# Patient Record
Sex: FEMALE | Race: WHITE | NOT HISPANIC OR LATINO
[De-identification: names, ages, dates, MRNs, and addresses within clinical notes are randomized per-mention and may not be internally consistent; named-entity substitution may affect disease eponyms.]

---
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
## 2024-09-28 ENCOUNTER — APPOINTMENT (OUTPATIENT)
Dept: GENERAL RADIOLOGY | Facility: HOSPITAL | Age: 19
End: 2024-09-28
Payer: COMMERCIAL

## 2024-09-28 ENCOUNTER — HOSPITAL ENCOUNTER (EMERGENCY)
Facility: HOSPITAL | Age: 19
Discharge: HOME | End: 2024-09-28
Attending: EMERGENCY MEDICINE
Payer: COMMERCIAL

## 2024-09-28 VITALS
OXYGEN SATURATION: 100 % | HEART RATE: 86 BPM | WEIGHT: 110 LBS | TEMPERATURE: 98.5 F | BODY MASS INDEX: 15.75 KG/M2 | HEIGHT: 70 IN | SYSTOLIC BLOOD PRESSURE: 96 MMHG | DIASTOLIC BLOOD PRESSURE: 58 MMHG | RESPIRATION RATE: 16 BRPM

## 2024-09-28 DIAGNOSIS — I95.1 SYNCOPE DUE TO ORTHOSTATIC HYPOTENSION: ICD-10-CM

## 2024-09-28 DIAGNOSIS — I95.1 ORTHOSTATIC SYNCOPE: Primary | ICD-10-CM

## 2024-09-28 LAB
ALBUMIN SERPL-MCNC: 5 G/DL (ref 3.5–5.2)
ALBUMIN/GLOB SERPL: 2.5 G/DL
ALP SERPL-CCNC: 61 U/L (ref 39–117)
ALT SERPL W P-5'-P-CCNC: 13 U/L (ref 1–33)
AMPHET+METHAMPHET UR QL: NEGATIVE
AMPHETAMINES UR QL: NEGATIVE
ANION GAP SERPL CALCULATED.3IONS-SCNC: 13 MMOL/L (ref 5–15)
AST SERPL-CCNC: 19 U/L (ref 1–32)
BARBITURATES UR QL SCN: NEGATIVE
BASOPHILS # BLD AUTO: 0.06 10*3/MM3 (ref 0–0.2)
BASOPHILS NFR BLD AUTO: 1.1 % (ref 0–1.5)
BENZODIAZ UR QL SCN: NEGATIVE
BILIRUB SERPL-MCNC: 0.5 MG/DL (ref 0–1.2)
BILIRUB UR QL STRIP: NEGATIVE
BUN SERPL-MCNC: 6 MG/DL (ref 6–20)
BUN/CREAT SERPL: 7.2 (ref 7–25)
BUPRENORPHINE SERPL-MCNC: NEGATIVE NG/ML
CALCIUM SPEC-SCNC: 9.2 MG/DL (ref 8.6–10.5)
CANNABINOIDS SERPL QL: POSITIVE
CHLORIDE SERPL-SCNC: 107 MMOL/L (ref 98–107)
CLARITY UR: CLEAR
CO2 SERPL-SCNC: 24 MMOL/L (ref 22–29)
COCAINE UR QL: NEGATIVE
COLOR UR: YELLOW
CREAT SERPL-MCNC: 0.83 MG/DL (ref 0.57–1)
DEPRECATED RDW RBC AUTO: 39.6 FL (ref 37–54)
EGFRCR SERPLBLD CKD-EPI 2021: 103.6 ML/MIN/1.73
EOSINOPHIL # BLD AUTO: 0.03 10*3/MM3 (ref 0–0.4)
EOSINOPHIL NFR BLD AUTO: 0.6 % (ref 0.3–6.2)
ERYTHROCYTE [DISTWIDTH] IN BLOOD BY AUTOMATED COUNT: 11.4 % (ref 12.3–15.4)
FENTANYL UR-MCNC: NEGATIVE NG/ML
GLOBULIN UR ELPH-MCNC: 2 GM/DL
GLUCOSE BLDC GLUCOMTR-MCNC: 110 MG/DL (ref 70–130)
GLUCOSE SERPL-MCNC: 117 MG/DL (ref 65–99)
GLUCOSE UR STRIP-MCNC: NEGATIVE MG/DL
HCG INTACT+B SERPL-ACNC: <0.1 MIU/ML
HCT VFR BLD AUTO: 43.6 % (ref 34–46.6)
HGB BLD-MCNC: 14.7 G/DL (ref 12–15.9)
HGB UR QL STRIP.AUTO: NEGATIVE
HOLD SPECIMEN: NORMAL
IMM GRANULOCYTES # BLD AUTO: 0.01 10*3/MM3 (ref 0–0.05)
IMM GRANULOCYTES NFR BLD AUTO: 0.2 % (ref 0–0.5)
KETONES UR QL STRIP: NEGATIVE
LEUKOCYTE ESTERASE UR QL STRIP.AUTO: NEGATIVE
LIPASE SERPL-CCNC: 76 U/L (ref 13–60)
LYMPHOCYTES # BLD AUTO: 2.5 10*3/MM3 (ref 0.7–3.1)
LYMPHOCYTES NFR BLD AUTO: 47.3 % (ref 19.6–45.3)
MAGNESIUM SERPL-MCNC: 2.3 MG/DL (ref 1.7–2.2)
MCH RBC QN AUTO: 32 PG (ref 26.6–33)
MCHC RBC AUTO-ENTMCNC: 33.7 G/DL (ref 31.5–35.7)
MCV RBC AUTO: 95 FL (ref 79–97)
METHADONE UR QL SCN: NEGATIVE
MONOCYTES # BLD AUTO: 0.32 10*3/MM3 (ref 0.1–0.9)
MONOCYTES NFR BLD AUTO: 6.1 % (ref 5–12)
NEUTROPHILS NFR BLD AUTO: 2.36 10*3/MM3 (ref 1.7–7)
NEUTROPHILS NFR BLD AUTO: 44.7 % (ref 42.7–76)
NITRITE UR QL STRIP: NEGATIVE
NRBC BLD AUTO-RTO: 0 /100 WBC (ref 0–0.2)
NT-PROBNP SERPL-MCNC: <36 PG/ML (ref 0–450)
OPIATES UR QL: NEGATIVE
OXYCODONE UR QL SCN: NEGATIVE
PCP UR QL SCN: NEGATIVE
PH UR STRIP.AUTO: 6.5 [PH] (ref 5–8)
PLATELET # BLD AUTO: 248 10*3/MM3 (ref 140–450)
PMV BLD AUTO: 10.3 FL (ref 6–12)
POTASSIUM SERPL-SCNC: 3.7 MMOL/L (ref 3.5–5.2)
PROT SERPL-MCNC: 7 G/DL (ref 6–8.5)
PROT UR QL STRIP: NEGATIVE
RBC # BLD AUTO: 4.59 10*6/MM3 (ref 3.77–5.28)
SODIUM SERPL-SCNC: 144 MMOL/L (ref 136–145)
SP GR UR STRIP: <=1.005 (ref 1–1.03)
TRICYCLICS UR QL SCN: NEGATIVE
TROPONIN T SERPL HS-MCNC: <6 NG/L
TSH SERPL DL<=0.05 MIU/L-ACNC: 3.37 UIU/ML (ref 0.27–4.2)
UROBILINOGEN UR QL STRIP: NORMAL
WBC NRBC COR # BLD AUTO: 5.28 10*3/MM3 (ref 3.4–10.8)
WHOLE BLOOD HOLD COAG: NORMAL
WHOLE BLOOD HOLD SPECIMEN: NORMAL

## 2024-09-28 PROCEDURE — 25810000003 SODIUM CHLORIDE 0.9 % SOLUTION: Performed by: EMERGENCY MEDICINE

## 2024-09-28 PROCEDURE — 82948 REAGENT STRIP/BLOOD GLUCOSE: CPT

## 2024-09-28 PROCEDURE — 71045 X-RAY EXAM CHEST 1 VIEW: CPT

## 2024-09-28 PROCEDURE — 99284 EMERGENCY DEPT VISIT MOD MDM: CPT

## 2024-09-28 PROCEDURE — 80307 DRUG TEST PRSMV CHEM ANLYZR: CPT | Performed by: EMERGENCY MEDICINE

## 2024-09-28 PROCEDURE — 83880 ASSAY OF NATRIURETIC PEPTIDE: CPT | Performed by: EMERGENCY MEDICINE

## 2024-09-28 PROCEDURE — 93005 ELECTROCARDIOGRAM TRACING: CPT | Performed by: EMERGENCY MEDICINE

## 2024-09-28 PROCEDURE — 84702 CHORIONIC GONADOTROPIN TEST: CPT | Performed by: EMERGENCY MEDICINE

## 2024-09-28 PROCEDURE — 83690 ASSAY OF LIPASE: CPT | Performed by: EMERGENCY MEDICINE

## 2024-09-28 PROCEDURE — 83735 ASSAY OF MAGNESIUM: CPT | Performed by: EMERGENCY MEDICINE

## 2024-09-28 PROCEDURE — 84484 ASSAY OF TROPONIN QUANT: CPT | Performed by: EMERGENCY MEDICINE

## 2024-09-28 PROCEDURE — 81003 URINALYSIS AUTO W/O SCOPE: CPT | Performed by: EMERGENCY MEDICINE

## 2024-09-28 PROCEDURE — 36415 COLL VENOUS BLD VENIPUNCTURE: CPT

## 2024-09-28 PROCEDURE — 80050 GENERAL HEALTH PANEL: CPT | Performed by: EMERGENCY MEDICINE

## 2024-09-28 RX ORDER — SODIUM CHLORIDE 0.9 % (FLUSH) 0.9 %
10 SYRINGE (ML) INJECTION AS NEEDED
Status: DISCONTINUED | OUTPATIENT
Start: 2024-09-28 | End: 2024-09-28 | Stop reason: HOSPADM

## 2024-09-28 RX ADMIN — SODIUM CHLORIDE 1000 ML: 9 INJECTION, SOLUTION INTRAVENOUS at 04:27

## 2024-09-28 NOTE — ED PROVIDER NOTES
Subjective   History of Present Illness  This is a 19-year-old female presented to the emergency department with syncopal episode.  The patient has had these for the last few days.  She states that the intensified this evening.  Patient was standing up from chair when she got very lightheaded.  She proceeded to pass out.  This was witnessed by her roommate who was accompanying her.  She had another episode shortly afterwards after she stood back up again.  She apparently was seen at an outlying facility for similar symptoms recently.  Patient states that she feels very lightheaded and just generally fatigued.  She does admit to some alcohol use this evening.  She denies any headache or change in vision.  No focal weakness.  No neck pain.  No chest pain or shortness of breath.  No abdominal pain or vomiting    History provided by:  Patient   used: No        Review of Systems   Constitutional:  Negative for chills and fever.   HENT:  Negative for congestion, ear pain and sore throat.    Eyes:  Negative for visual disturbance.   Respiratory:  Negative for shortness of breath.    Cardiovascular:  Negative for chest pain.   Gastrointestinal:  Negative for abdominal pain.   Genitourinary:  Negative for difficulty urinating.   Musculoskeletal:  Negative for arthralgias.   Skin:  Negative for rash.   Neurological:  Positive for syncope. Negative for dizziness, weakness and numbness.   Psychiatric/Behavioral:  Negative for agitation.        No past medical history on file.    No Known Allergies    No past surgical history on file.    No family history on file.    Social History     Socioeconomic History    Marital status: Single           Objective   Physical Exam  Vitals and nursing note reviewed.   Constitutional:       General: She is not in acute distress.     Appearance: She is not ill-appearing or toxic-appearing.   HENT:      Mouth/Throat:      Pharynx: No posterior oropharyngeal erythema.   Eyes:       Conjunctiva/sclera: Conjunctivae normal.      Pupils: Pupils are equal, round, and reactive to light.   Cardiovascular:      Rate and Rhythm: Normal rate and regular rhythm.   Pulmonary:      Effort: Pulmonary effort is normal. No respiratory distress.   Abdominal:      General: Abdomen is flat. There is no distension.      Palpations: There is no mass.      Tenderness: There is no abdominal tenderness. There is no guarding or rebound.   Musculoskeletal:         General: No deformity. Normal range of motion.   Skin:     General: Skin is warm.      Findings: No rash.   Neurological:      General: No focal deficit present.      Mental Status: She is alert and oriented to person, place, and time.      Motor: No weakness.         ECG 12 Lead      Date/Time: 9/28/2024 5:40 AM    Performed by: Aaron Borges MD  Authorized by: Aaron Borges MD  Interpreted by ED physician  Previous ECG: no previous ECG available  Rhythm: sinus tachycardia  Rate: tachycardic  BPM: 104  QRS axis: right  Conduction: conduction normal  ST Segments: ST segments normal  Other: no other findings  Clinical impression: normal ECG  Comments: Interpretation:  EKG was directly visualized by myself, interpretations as documented in hospital course.               ED Course  ED Course as of 09/28/24 0546   Sat Sep 28, 2024   0543 Temp: 98.5 °F (36.9 °C) [JK]   0543 BP: 96/58 [JK]   0543 Temp src: Oral [JK]   0543 Heart Rate(!): 135  Interpretation:  Patient's repeat vitals, telemetry tracing, and pulse oximetry tracing were directly viewed and interpreted by myself.   O2 sat 99% on room air, interpreted as normal  Telemetry rhythm strip revealed a rate of 135 bpm, interpreted as sinus tachycardia [JK]   0543 We did perform orthostatic vitals which were significantly positive and the patient [JK]   0543 Urinalysis With Microscopic If Indicated (No Culture) - Urine, Clean Catch [JK]   0543 Urine Drug Screen - Urine, Clean Catch(!) [JK]    0543 Fentanyl, Urine - Urine, Clean Catch [JK]   0543 POC Glucose Once [JK]   0543 BNP [JK]   0543 hCG, Quantitative, Pregnancy [JK]   0543 Magnesium(!) [JK]   0543 Comprehensive Metabolic Panel(!) [JK]   0543 TSH [JK]   0543 Single High Sensitivity Troponin T  Interpretation:  Laboratory studies were reviewed and interpreted directly by myself.  CBC was unremarkable, lipase was marginal at 76, pregnancy levels negative, BNP negative, urine drug screen positive for THC, CMP unremarkable, urinalysis normal, troponin normal, TSH normal [JK]   0543 XR Chest 1 View  Interpretation:  Imaging was directly visualized by myself, per my interpretations, chest x-ray was unremarkable [JK]   0544 On reevaluation, the patient is feeling better.  Symptoms are consistent with orthostatic syncope.  I do believe she would benefit from evaluation by our syncope clinic.  Patient is agreeable to this.  I will place referral.  We had a long discussion about appropriately changing positions.  Patient was given strict return precautions.  Verbalized understanding. [JK]   0544 I had a discussion with the patient/family regarding diagnosis, diagnostic results, treatment plan, and medications. The patient/family indicated understanding of these instructions. I spent adequate time at the bedside prior to discharge necessary to discuss the aftercare instructions, giving patient education, providing explanations of the results of our evaluations/findings, and my decision making to assure that the patient/family understand the plan of care. Time was allotted to answer questions at that time and throughout the ED course. Patient is required to maintain timely follow up, as discussed. I also discussed the potential for the development of an acute emergent condition requiring further evaluation, return to the ER, admission, or even surgical intervention.  I encouraged the patient to return to the emergency department immediately for any concerns,  worsening symptoms, new complaints, or if symptoms persist and they are unable to seek follow-up in a timely fashion. The patient/family expressed understanding and agreement with this plan    Shared decision making:   After full review of the patient's clinical presentation, review of any work-up including but not limited to laboratory studies and radiology obtained, I had a discussion with the patient.  Treatment options were discussed as well as the risks, benefits and consequences.  I discussed all findings with the patient and family members if available.  During the discussion, treatment goals were understood by all as well as any misconceptions which were addressed with the patient.  Ample time was given for any questions they may have had.  They are in agreement with the treatment plan as well as final disposition. [JK]      ED Course User Index  [JK] Aaron Borges MD                                             Medical Decision Making  This is a 19-year-old female presented to the emergency department with a syncopal episode.  The patient is endorsing some postural syncope.  I am concerned for possible orthostatic hypotension.  Patient's neurologic exam is normal at this time.  Overall, the patient is nontoxic.  Afebrile.  IV access was established and the patient.  Placed on continuous telemetry monitoring.  Given the patient's presentation, differential is broad and will require further evaluation.  Workup initiated.      Differential diagnosis: Syncope, near syncope, orthostasis, vasovagal episode, POTS, dysrhythmia, acute kidney injury, electrolyte abnormality, anemia      Amount and/or Complexity of Data Reviewed  External Data Reviewed: labs, radiology, ECG and notes.     Details: External laboratories, imaging as well as notes were reviewed personally by myself.  All relevant studies were used to guide decision making.     Date of previous record: 9/26/2024    Source of note:  emergency  department    Summary: Patient was seen evaluate for similar symptoms.  I did review basic laboratory studies on file as well as previous chest x-ray and EKG.  Records reviewed    Labs: ordered. Decision-making details documented in ED Course.  Radiology: ordered and independent interpretation performed. Decision-making details documented in ED Course.  ECG/medicine tests: ordered and independent interpretation performed. Decision-making details documented in ED Course.    Risk  Prescription drug management.        Final diagnoses:   Orthostatic syncope   Syncope due to orthostatic hypotension       ED Disposition  ED Disposition       ED Disposition   Discharge    Condition   Stable    Comment   --               [facility] CARDIOLOGY  [address]  [facility] [id number] [phone]  Call in 1 day           Medication List      No changes were made to your prescriptions during this visit.            Aaron Borges MD  09/28/24 0546

## 2024-09-28 NOTE — Clinical Note
[facility] EMERGENCY DEPARTMENT  [address]  [facility] [address]  Phone: [phone]    Celeste Johnson was seen and treated in our emergency department on 9/28/2024.  She may return to work on 10/01/2024.         Thank you for choosing [facility].    Aaron Borges MD

## 2024-09-29 LAB
QT INTERVAL: 350 MS
QTC INTERVAL: 460 MS

## 2024-09-30 ENCOUNTER — TELEPHONE (OUTPATIENT)
Dept: CARDIOLOGY | Facility: HOSPITAL | Age: 19
End: 2024-09-30
Payer: COMMERCIAL

## 2024-09-30 NOTE — TELEPHONE ENCOUNTER
Caller: PetraCeleste Rose     Relationship: [unfilled]     Best call back number: [phone]    What is your medical concern? PATIENT IS A NEW PT WITH HER FIRST APPOINTMENT ON 10.03.24. PATIENT IS HAVING EPISODES WHERE SHE IS PASSING OUT, AFTERWARDS SHE FEELS DIZZY AND NAUSEOUS. THIS JUST STARTED HAPPENING ABOUT A WEEK THOUGH. IT IS INCREASING IN FREQUENCY. SHE IS SCHEDULED TO FLY OUT OF STATE ON 10.03.24. SHE IS VERY WORRIED ABOUT THIS AND WOULD LIKE TO SPEAK TO SOMEONE TO SEE IF ITS SAFE FOR HER TO FLY. PLEASE REACH OUT TO ADVISE PT.

## 2024-10-01 ENCOUNTER — OFFICE VISIT (OUTPATIENT)
Dept: CARDIOLOGY | Facility: HOSPITAL | Age: 19
End: 2024-10-01
Payer: COMMERCIAL

## 2024-10-01 ENCOUNTER — HOSPITAL ENCOUNTER (OUTPATIENT)
Dept: CARDIOLOGY | Facility: HOSPITAL | Age: 19
Discharge: HOME | End: 2024-10-01
Payer: COMMERCIAL

## 2024-10-01 VITALS
HEART RATE: 86 BPM | RESPIRATION RATE: 18 BRPM | WEIGHT: 117.6 LBS | TEMPERATURE: 97.5 F | OXYGEN SATURATION: 99 % | DIASTOLIC BLOOD PRESSURE: 62 MMHG | BODY MASS INDEX: 16.83 KG/M2 | HEIGHT: 70 IN | SYSTOLIC BLOOD PRESSURE: 101 MMHG

## 2024-10-01 DIAGNOSIS — R55 SYNCOPE AND COLLAPSE: Primary | ICD-10-CM

## 2024-10-01 DIAGNOSIS — F41.9 ANXIETY: ICD-10-CM

## 2024-10-01 DIAGNOSIS — R55 SYNCOPE AND COLLAPSE: ICD-10-CM

## 2024-10-01 PROCEDURE — 99204 OFFICE O/P NEW MOD 45 MIN: CPT | Performed by: NURSE PRACTITIONER

## 2024-10-01 NOTE — PROGRESS NOTES
[facility] Heart Monitor Documentation    Celeste Johnson  2005  [id number]  10/01/24      [] ZIO XT Patch  Model [id number] Prescribed for  Days    Serial Number: (N + 9 Digits) N   Apply-By Date on Box:   USPS Tracking Number:   USPS Tracking        [x] [employer]ian MINI PLUS Mobile Cardiac Telemetry  Model BGMINIPLUS Prescribed for 30 Days    Serial Number: (BGM + 7 Digits) [id number]  Shipped-By Date on Box: 09/24/2024  UPS Tracking Number: [id number]  UPS Tracking      [] [employer] MINI Holter Monitor  Model BGMINIEL Prescribed for  Days    Serial Number: (7 Digits)   Shipped-By Date on Box:   UPS Tracking Number: [id number]  UPS Tracking        This monitor was applied to the patient's chest and checked for proper functioning.  Ms. Celeste Johnson was instructed in the proper use of this monitor.  She was given the opportunity to ask questions and left the office with the device 's instruction manual.    Amarilis Brandt CMA, 09:54 EDT, 10/01/24                  [facility] 8[facility]

## 2024-10-01 NOTE — PATIENT INSTRUCTIONS
- Heart monitor for 1 month     - Office will schedule testing  - Office will call or send message with testing results    - Neurology department will call for appointment    - Contact [facility] PCP for evaluation

## 2024-10-01 NOTE — PROGRESS NOTES
"Chief Complaint  Loss of Consciousness    Subjective      History of Present Illness {  Problem List  Visit  Diagnosis   Encounters  Notes  Medications  Labs  Result Review Imaging  Media :23}     Celeste Johnson, 19 y.o. female with anxiety- history of panic attacks, syncope presents to [facility] clinic for Loss of Consciousness.    Patient presents today after recent emergency department evaluations for suspected vasovagal/orthostatic syncope.  Recent episode that prompted Psychiatric ED evaluation occurred while standing up from chair and she got lightheaded with syncope. ED workup with normal TSH, negative troponin.  ECG revealed sinus tachycardia.    Presents today reporting recurrent episode of syncope since ED evaluation. One episode while standing from sitting position at library and then walking to her car. No tachypalpitation or cardiac symptoms around the time of these events, no exertional syncope. Reports nausea after syncopal episodes. Reports prior syncopal episode that required ED evaluation she was drinking alcohol at the time. History of orthostatic symptoms. Reports 60-90oz of hydration per day, 2 meals per day currently. Minimal caffeine. No family history of arrhythmia.        Objective     Vital Signs:   Vitals:    10/01/24 0844 10/01/24 0845   BP: 116/71 101/62   BP Location: Left arm Left arm   Patient Position: Standing Sitting   Cuff Size: Adult Adult   Pulse: 111 86   Resp:  18   Temp: 97.5 °F (36.4 °C) 97.5 °F (36.4 °C)   TempSrc: Temporal Temporal   SpO2: 97% 99%   Weight:  53.3 kg (117 lb 9.6 oz)   Height:  177.8 cm (70\")     Body mass index is 16.87 kg/m².  Physical Exam  Vitals and nursing note reviewed.   Constitutional:       Appearance: Normal appearance.   HENT:      Head: Normocephalic.   Eyes:      Extraocular Movements: Extraocular movements intact.   Neck:      Vascular: No carotid bruit.   Cardiovascular:      Rate and Rhythm: Normal rate " and regular rhythm.      Pulses: Normal pulses.      Heart sounds: Normal heart sounds, S1 normal and S2 normal. No murmur heard.  Pulmonary:      Effort: Pulmonary effort is normal. No respiratory distress.      Breath sounds: Normal breath sounds.   Musculoskeletal:      Cervical back: Neck supple.      Right lower leg: No edema.      Left lower leg: No edema.   Skin:     General: Skin is warm and dry.   Neurological:      General: No focal deficit present.      Mental Status: She is alert.   Psychiatric:         Mood and Affect: Mood normal.         Behavior: Behavior normal.         Thought Content: Thought content normal.        Data Reviewed:{ Labs  Result Review  Imaging  Med Tab  Media :23}     TSH (09/28/2024 03:57)  BNP (09/28/2024 03:57)  Single High Sensitivity Troponin T (09/28/2024 03:57)  Magnesium (09/28/2024 03:57)  Comprehensive Metabolic Panel (09/28/2024 03:57)  CBC & Differential (09/28/2024 03:57)  hCG, Quantitative, Pregnancy (09/28/2024 03:57)  ECG 12 Lead ED Triage Standing Order; Syncope (09/28/2024 04:08)       Assessment & Plan   Assessment and Plan {CC Problem List  Visit Diagnosis  ROS  Review (Popup)  Health Maintenance  Quality  BestPractice  Medications  SmartSets  SnapShot Encounters  Media :23}       1. Syncope and collapse  -Recent recurrent syncope prompted ED evaluations.   -Suspected vasovagal episodes given symptom description  -ED workup with normal TSH, negative troponin.  ECG revealed sinus tachycardia.  -Given new onset of symptom description will complete 30-day MCOT for arrhythmia surveillance, TTE for structural/functional evaluation, referral to [facility] dysautonomia clinic given recurrent and limiting symptoms  - Adult Transthoracic Echo Complete W/ Cont if Necessary Per Protocol; Future  - Mobile Cardiac Outpatient Telemetry; Future  - Ambulatory Referral to Neurology; [facility] - dysautonomia clinic  -Extensive discussion on vasovagal  triggers, increasing hydration/sodium intake, compression garment/sock use    2. Anxiety  -Notes a history of anxiety with panic attacks  -Discussed anxiety could be contributing to vagal nerve stimulation and would advise establishing with PCP for further evaluation      Follow Up {Instructions Charge Capture  Follow-up Communications :23}     Return in about 6 weeks (around 11/12/2024) for Office follow-up, Monitor results, Results follow-up, Recheck, BP check.      Patient was given instructions and counseling regarding her condition or for health maintenance advice. Please see specific information pulled into the AVS if appropriate. Patient was instructed to call the [facility] with any questions, concerns, or worsening symptoms.    Dictated Utilizing Dragon Dictation   Please note that portions of this note were completed with a voice recognition program. Part of this note may be an electronic transcription/translation of spoken language to printed text using the Dragon Dictation System.

## 2024-10-07 ENCOUNTER — HOSPITAL ENCOUNTER (OUTPATIENT)
Facility: HOSPITAL | Age: 19
Discharge: HOME | End: 2024-10-07
Admitting: NURSE PRACTITIONER
Payer: COMMERCIAL

## 2024-10-07 VITALS
WEIGHT: 117.5 LBS | DIASTOLIC BLOOD PRESSURE: 70 MMHG | SYSTOLIC BLOOD PRESSURE: 120 MMHG | BODY MASS INDEX: 16.82 KG/M2 | HEIGHT: 70 IN

## 2024-10-07 DIAGNOSIS — R55 SYNCOPE AND COLLAPSE: ICD-10-CM

## 2024-10-07 LAB
ASCENDING AORTA: 2.7 CM
BH CV ECHO MEAS - AO MAX PG: 5 MMHG
BH CV ECHO MEAS - AO MEAN PG: 3 MMHG
BH CV ECHO MEAS - AO ROOT DIAM: 2.9 CM
BH CV ECHO MEAS - AO V2 MAX: 112 CM/SEC
BH CV ECHO MEAS - AO V2 VTI: 23.6 CM
BH CV ECHO MEAS - AVA(I,D): 2.6 CM2
BH CV ECHO MEAS - EDV(CUBED): 79.5 ML
BH CV ECHO MEAS - EDV(MOD-SP2): 80.9 ML
BH CV ECHO MEAS - EDV(MOD-SP4): 84.4 ML
BH CV ECHO MEAS - EF(MOD-BP): 60.6 %
BH CV ECHO MEAS - EF(MOD-SP2): 62.3 %
BH CV ECHO MEAS - EF(MOD-SP4): 59.1 %
BH CV ECHO MEAS - ESV(CUBED): 24.4 ML
BH CV ECHO MEAS - ESV(MOD-SP2): 30.5 ML
BH CV ECHO MEAS - ESV(MOD-SP4): 34.5 ML
BH CV ECHO MEAS - FS: 32.6 %
BH CV ECHO MEAS - IVS/LVPW: 1 CM
BH CV ECHO MEAS - IVSD: 0.6 CM
BH CV ECHO MEAS - LA DIMENSION: 2.4 CM
BH CV ECHO MEAS - LAT PEAK E' VEL: 17.3 CM/SEC
BH CV ECHO MEAS - LV DIASTOLIC VOL/BSA (35-75): 50.8 CM2
BH CV ECHO MEAS - LV MASS(C)D: 72.9 GRAMS
BH CV ECHO MEAS - LV MAX PG: 3.6 MMHG
BH CV ECHO MEAS - LV MEAN PG: 2 MMHG
BH CV ECHO MEAS - LV SYSTOLIC VOL/BSA (12-30): 20.8 CM2
BH CV ECHO MEAS - LV V1 MAX: 94.9 CM/SEC
BH CV ECHO MEAS - LV V1 VTI: 19.5 CM
BH CV ECHO MEAS - LVIDD: 4.3 CM
BH CV ECHO MEAS - LVIDS: 2.9 CM
BH CV ECHO MEAS - LVOT AREA: 3.1 CM2
BH CV ECHO MEAS - LVOT DIAM: 2 CM
BH CV ECHO MEAS - LVPWD: 0.6 CM
BH CV ECHO MEAS - MED PEAK E' VEL: 11.6 CM/SEC
BH CV ECHO MEAS - MV A MAX VEL: 58.7 CM/SEC
BH CV ECHO MEAS - MV DEC SLOPE: 284 CM/SEC2
BH CV ECHO MEAS - MV DEC TIME: 0.21 SEC
BH CV ECHO MEAS - MV E MAX VEL: 79.1 CM/SEC
BH CV ECHO MEAS - MV E/A: 1.35
BH CV ECHO MEAS - MV MAX PG: 3.9 MMHG
BH CV ECHO MEAS - MV MEAN PG: 2 MMHG
BH CV ECHO MEAS - MV P1/2T: 102.6 MSEC
BH CV ECHO MEAS - MV V2 VTI: 27.3 CM
BH CV ECHO MEAS - MVA(P1/2T): 2.14 CM2
BH CV ECHO MEAS - MVA(VTI): 2.24 CM2
BH CV ECHO MEAS - PA ACC TIME: 0.17 SEC
BH CV ECHO MEAS - SV(LVOT): 61.3 ML
BH CV ECHO MEAS - SV(MOD-SP2): 50.4 ML
BH CV ECHO MEAS - SV(MOD-SP4): 49.9 ML
BH CV ECHO MEAS - SVI(LVOT): 36.9 ML/M2
BH CV ECHO MEAS - SVI(MOD-SP2): 30.3 ML/M2
BH CV ECHO MEAS - SVI(MOD-SP4): 30 ML/M2
BH CV ECHO MEAS - TAPSE (>1.6): 2.48 CM
BH CV ECHO MEAS - TR MAX PG: 10.6 MMHG
BH CV ECHO MEAS - TR MAX VEL: 162.5 CM/SEC
BH CV ECHO MEASUREMENTS AVERAGE E/E' RATIO: 5.47
BH CV XLRA - RV BASE: 3 CM
BH CV XLRA - RV LENGTH: 6.2 CM
BH CV XLRA - RV MID: 2.3 CM
BH CV XLRA - TDI S': 13.1 CM/SEC
LEFT ATRIUM VOLUME INDEX: 13.7 ML/M2

## 2024-10-07 PROCEDURE — 93306 TTE W/DOPPLER COMPLETE: CPT

## 2024-10-07 PROCEDURE — 93306 TTE W/DOPPLER COMPLETE: CPT | Performed by: INTERNAL MEDICINE

## 2024-10-07 NOTE — PROGRESS NOTES
Your echocardiogram is within normal limits.   Your heart squeezes normally.  There are no significant valvular abnormalities noted. Please keep your follow up with Cody RAMOS.

## 2024-10-28 ENCOUNTER — DOCUMENTATION (OUTPATIENT)
Dept: CARDIOLOGY | Facility: HOSPITAL | Age: 19
End: 2024-10-28
Payer: COMMERCIAL

## 2024-10-28 NOTE — PROGRESS NOTES
Event monitor report from 10/25/24, patient triggered symptom that she passed out at 1:11 AM. Report has been printed for your review.

## 2024-11-12 ENCOUNTER — OFFICE VISIT (OUTPATIENT)
Dept: CARDIOLOGY | Facility: HOSPITAL | Age: 19
End: 2024-11-12
Payer: COMMERCIAL

## 2024-11-12 VITALS
BODY MASS INDEX: 17.15 KG/M2 | DIASTOLIC BLOOD PRESSURE: 58 MMHG | SYSTOLIC BLOOD PRESSURE: 121 MMHG | TEMPERATURE: 97 F | WEIGHT: 119.8 LBS | RESPIRATION RATE: 18 BRPM | OXYGEN SATURATION: 96 % | HEIGHT: 70 IN | HEART RATE: 86 BPM

## 2024-11-12 DIAGNOSIS — F41.9 ANXIETY: Primary | ICD-10-CM

## 2024-11-12 PROCEDURE — 99214 OFFICE O/P EST MOD 30 MIN: CPT | Performed by: NURSE PRACTITIONER

## 2024-11-12 NOTE — PROGRESS NOTES
Chief Complaint  Loss of Consciousness    Subjective      History of Present Illness {  Problem List  Visit  Diagnosis   Encounters  Notes  Medications  Labs  Result Review Imaging  Media :23}     Celeste Allen-Milton, 19 y.o. female with anxiety- history of panic attacks, syncope presents to [facility] for Loss of Consciousness.    Since previous evaluation patient completed echocardiogram that revealed normal LV systolic function, normal LV/RV cavity size and wall thickness, cardiac valves structurally normal with no significant stenosis/regurgitation, normal pericardium. Cardiac monitor completed with diagnostic data approximately 18 days, 18 hours.  No significant arrhythmia on cardiac monitor.  Low burden PAC/PVC less than 1%.  Heart rate minimum 47, average 85, maximum 179. Triggered events, which were associated with the symptom of passing out/lightheadedness, were associated with sinus tachycardia.    Presents today reporting intermittent syncope recurrence, but less frequent. A few episodes while wearing cardiac monitor, 4-5 events while wearing cardiac monitor; no arrhythmia during events on [facility]. Continued intermittent syncope 1x per week, does note awareness of prodromal response. Does report she was likely pregnant around the time of previous evaluation with possibly miscarriage, follow-up planned with OBGYN. Working on increasing hydration and maintaining adequate nutrition.       Previous evaluation:  Patient presents today after recent emergency department evaluations for suspected vasovagal/orthostatic syncope.  Recent episode that prompted [facility] ED evaluation occurred while standing up from chair and she got lightheaded with syncope. ED workup with normal TSH, negative troponin.  ECG revealed sinus tachycardia.    Presents today reporting recurrent episode of syncope since ED evaluation. One episode while standing from sitting position at library and  "then walking to her car. No tachypalpitation or cardiac symptoms around the time of these events, no exertional syncope. Reports nausea after syncopal episodes. Reports prior syncopal episode that required ED evaluation she was drinking alcohol at the time. History of orthostatic symptoms. Reports 60-90oz of hydration per day, 2 meals per day currently. Minimal caffeine. No family history of arrhythmia.        Objective     Vital Signs:   Vitals:    11/12/24 1109   BP: 121/58   BP Location: Left arm   Patient Position: Sitting   Cuff Size: Adult   Pulse: 86   Resp: 18   Temp: 97 °F (36.1 °C)   TempSrc: Temporal   SpO2: 96%   Weight: 54.3 kg (119 lb 12.8 oz)   Height: 177.8 cm (70\")     Body mass index is 17.19 kg/m².  Physical Exam  Vitals and nursing note reviewed.   Constitutional:       Appearance: Normal appearance.   HENT:      Head: Normocephalic.   Eyes:      Extraocular Movements: Extraocular movements intact.   Neck:      Vascular: No carotid bruit.   Cardiovascular:      Rate and Rhythm: Normal rate and regular rhythm.      Pulses: Normal pulses.      Heart sounds: Normal heart sounds, S1 normal and S2 normal. No murmur heard.  Pulmonary:      Effort: Pulmonary effort is normal. No respiratory distress.      Breath sounds: Normal breath sounds.   Musculoskeletal:      Cervical back: Neck supple.      Right lower leg: No edema.      Left lower leg: No edema.   Skin:     General: Skin is warm and dry.   Neurological:      General: No focal deficit present.      Mental Status: She is alert.   Psychiatric:         Mood and Affect: Mood normal.         Behavior: Behavior normal.         Thought Content: Thought content normal.        Data Reviewed:{ Labs  Result Review  Imaging  Med Tab  Media :23}     Cardiac Event Monitor (NITHIN) or Mobile Cardiac Outpatient Telemetry (MCT) (10/01/2024 14:36)  Adult Transthoracic Echo Complete W/ Cont if Necessary Per Protocol (10/07/2024 14:43)    TSH (09/28/2024 " 03:57)  BNP (09/28/2024 03:57)  Single High Sensitivity Troponin T (09/28/2024 03:57)  Magnesium (09/28/2024 03:57)  Comprehensive Metabolic Panel (09/28/2024 03:57)  CBC & Differential (09/28/2024 03:57)  hCG, Quantitative, Pregnancy (09/28/2024 03:57)  ECG 12 Lead ED Triage Standing Order; Syncope (09/28/2024 04:08)       Assessment & Plan   Assessment and Plan {CC Problem List  Visit Diagnosis  ROS  Review (Popup)  [facility]  Quality  BestPractice  Medications  SmartSets  SnapShot Encounters  Media :23}       1. Syncope and collapse  -Recent recurrent syncope prompted ED evaluations.   -Suspected vasovagal, likely non-cardiac given recent cardiac testing and no arrhythmia during events. Possibly related to increased stress with potential miscarriage.  -ED workup with normal TSH, negative troponin.  ECG revealed sinus tachycardia.  -Cardiac monitor completed with diagnostic data approximately 18 days, 18 hours.  No significant arrhythmia on cardiac monitor.  Low burden PAC/PVC less than 1%.  Heart rate minimum 47, average 85, maximum 179. Triggered events, which were associated with the symptom of passing out/lightheadedness, were associated with sinus tachycardia.  -TTE 10/7/24 normal LV systolic function, normal LV/RV cavity size and wall thickness, cardiac valves structurally normal with no significant stenosis/regurgitation, normal pericardium.  -Referral to [facility]onomia clinic pending, ordered during initial evaluation. Continue with scheduled appointment.  -Discussion on vasovagal triggers, increasing hydration/sodium intake, compression garment/sock use.  -Discussed follow-up recommendations on scheduled [facility] neurology/dysautonomia, and PCP; option of cardiology referral. She would prefer to continue efforts on stress reduction, avoiding vagal nerve triggering factors and follow-up with PCP/neurology as scheduled.  Discussed contacting heart valve center for worsened symptoms or if  further cardiac needs arise in future.    2. Anxiety  -Notes a history of anxiety with panic attacks  -Discussed anxiety could be contributing to vagal nerve stimulation and completed establishing care with PCP for further evaluation, behavioral health appointment at  pending.  Requesting referral to [facility] behavioral health medicine.  -Referral to [facility] behavioral health medicine placed      Follow Up {Instructions Charge Capture  Follow-up Communications :23}     Return if symptoms worsen or fail to improve.    Time Spent: I spent 34 minutes caring for Celeste Johnson on this date of service. This time includes time spent by me in the following activities: preparing for the visit, reviewing tests, obtaining and/or reviewing a separately obtained history, performing a medically appropriate examination and/or evaluation, counseling and educating the patient/family/caregiver, documenting information in the medical record and independently interpreting results and communicating that information with the patient/family/caregiver. All time noted occurred on the date of service.      Patient was given instructions and counseling regarding her condition or for health maintenance advice. Please see specific information pulled into the AVS if appropriate. Patient was instructed to call the [facility] with any questions, concerns, or worsening symptoms.    Dictated Utilizing Dragon Dictation   Please note that portions of this note were completed with a voice recognition program. Part of this note may be an electronic transcription/translation of spoken language to printed text using the Dragon Dictation System.

## 2024-12-18 ENCOUNTER — OFFICE VISIT (OUTPATIENT)
Age: 19
End: 2024-12-18
Payer: COMMERCIAL

## 2024-12-18 VITALS
BODY MASS INDEX: 17.31 KG/M2 | DIASTOLIC BLOOD PRESSURE: 72 MMHG | HEIGHT: 70 IN | SYSTOLIC BLOOD PRESSURE: 118 MMHG | HEART RATE: 92 BPM | WEIGHT: 120.9 LBS

## 2024-12-18 DIAGNOSIS — F41.0 GENERALIZED ANXIETY DISORDER WITH PANIC ATTACKS: Primary | ICD-10-CM

## 2024-12-18 DIAGNOSIS — F41.1 GENERALIZED ANXIETY DISORDER WITH PANIC ATTACKS: Primary | ICD-10-CM

## 2024-12-18 DIAGNOSIS — F33.1 MODERATE EPISODE OF RECURRENT MAJOR DEPRESSIVE DISORDER: ICD-10-CM

## 2024-12-18 DIAGNOSIS — Z51.81 ENCOUNTER FOR THERAPEUTIC DRUG MONITORING: ICD-10-CM

## 2024-12-18 RX ORDER — ONDANSETRON 4 MG/1
4 TABLET, FILM COATED ORAL DAILY
Qty: 30 TABLET | Refills: 0 | Status: SHIPPED | OUTPATIENT
Start: 2024-12-18

## 2024-12-18 RX ORDER — PROPRANOLOL HYDROCHLORIDE 10 MG/1
10-20 TABLET ORAL DAILY PRN
Qty: 120 TABLET | Refills: 0 | Status: SHIPPED | OUTPATIENT
Start: 2024-12-18

## 2024-12-18 RX ORDER — ESCITALOPRAM OXALATE 10 MG/1
TABLET ORAL
Qty: 57 TABLET | Refills: 0 | Status: SHIPPED | OUTPATIENT
Start: 2024-12-18 | End: 2025-02-16

## 2024-12-18 NOTE — PROGRESS NOTES
New Patient Office Visit      Date: 2024  Patient Name: Celeste Johnson  : 2005   MRN: [id number]   Care Team: Patient Care Team:  Gisela Elizabeth MD as PCP - General (Internal Medicine)  Yohana Mcdonald APRN  (Psychiatry)    Referring Provider: Gisela Elizabeth MD    Chief Complaint:      ICD-10-CM ICD-9-CM   1. Generalized anxiety disorder with panic attacks  F41.1 300.02    F41.0 300.01   2. Moderate episode of recurrent major depressive disorder  F33.1 296.32   3. Encounter for therapeutic drug monitoring  Z51.81 V58.83        Celeste Johnson is a 19 y.o. female who is here today for anxiety and panic. This is their her encounter with this provider.  History of Present Illness   She is here to discuss medication management of her anxiety and depression. She was initially diagnosed with anxiety during her senior year of high school, experiencing multiple daily panic attacks. She was prescribed BuSpar 15 mg daily, which she discontinued 2 to 3 months into her freshman year of college due to perceived ineffectiveness and difficulty remembering to take it. Her anxiety improved during her freshman and sophomore years but has since worsened over the past year.   She has previously engaged in therapy but found it unhelpful.    She experienced severe fainting spells around the end of 2024 and beginning of 10/2024, necessitating 2 emergency room visits. Initially suspected to be POTS, it was later diagnosed as vasovagal syncope. Her primary care physician and cardiologist suggested that her anxiety may be contributing to her symptoms and suggested she see behavioral health.. She reports that her heart rate increases prior to fainting and decreases during episodes and she has been advised to increase her salt intake.       ANXIETY  She reports increased stress, difficulty initiating daily activities, and decreased appetite, which she attributes to her anxiety and depression. She  "experiences intrusive thoughts about unlikely scenarios and recalls a traumatic event at the end of her senior year involving a suicide accusation.  Patient reports lots of anxiety related to school and wondering if she is going to pass out while walking to class. She worries about what people think about her, how her future will play out,  academics. She rates her daily anxiety is 9/10 on 0-10 scale with 10 being the worst.   She has experienced panic attacks, with the most recent episode occurring yesterday morning due to an exam. She reports some social anxiety but does not find it debilitating. She denies OCD symptoms    MOOD  She also reports mood disturbances, triggered by  a recent pregnancy scare in Nov and conflicts with her roommate  (she works with)  started  rumors and drama ensued.  This has left her not wanting to leave her room. She notices her mood \"dips\" in smith months, but more so this year.  She rates her depression as 5 or 6 on good days and 7 or 8 on bad days. Her PHQ score is 7 today which appears low compared to verbal report.  Patient denies ever experiencing any cluster of symptoms that might indicate fred or hypomania such as decrease need for sleep, rapid speech pattern, risky behavior, increase in energy, goal directed activity or grandiosity. She reports no suicidal ideation. She reports no history of suicide attempts, self-harm, or psychiatric hospitalizations. room mate  .         SLEEP/APPETITE   She reports fluctuating energy levels, with periods of high energy and low sleep requirements followed by periods of excessive sleep.No other sx of bipolarity noted.  She reports recent difficulty falling asleep and staying asleep, describing herself as a night owl. She reports no nightmares.  Appetite is down due to anxiety.  She has always been more of a snacker than eating a full meal. She gets pretty full fast. She has not taken Zofran.Admits physicians in the past have told her she " needs to gain weight.  Patient denies any past or present eating disorders. Reports she sometimes vomits due to anxiety.      SOCIAL HISTORY  Patient was Born and raised in [place] by her parents and came to [place] to attend . Raised by parents and has a younger brother. She describes her childhood as very good and healthy. 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  She lives off campus with 3 roommates     FAMILY HISTORY  Her brother has ADHD. Her mother has anxiety but no formal diagnosis. Her paternal grandfather has had 2 or 3 heart attacks, and her maternal grandfather has heart disease. Her father has high blood pressure. Her grandfather and first cousin have epilepsy. She is unsure about a family history of thyroid disease.    LEGAL/SUBSTANCE USE  She occasionally uses marijuana (1x week)  for anxiety relief and consumes alcohol socially, approximately 3 drinks per week. She reports no legal history, seizures, or head injuries     Diagnosis:anxiety  Medications: none  Therapy: none    Subjective      Review of Systems:   Review of Systems   Constitutional:  Positive for activity change, appetite change and fatigue.   Psychiatric/Behavioral:  Positive for depressed mood and stress. The patient is nervous/anxious.    All other systems reviewed and are negative.      Depression Screening:  Patient screened positive for depression based on a PHQ-9 score of 7 on 12/18/2024. Follow-up recommendations include: Prescribed antidepressant medication treatment.      PHQ-9 Depression Screening  Little interest or pleasure in doing things? Several days   Feeling down, depressed, or hopeless? Not at all   PHQ-2 Total Score 1   Trouble falling or staying asleep, or sleeping too much? Over half   Feeling tired or having little energy? Over half   Poor appetite or overeating? Over half    Feeling bad about yourself - or that you are a failure or have let yourself or your family down? Not at all   Trouble concentrating on things, such as reading the newspaper or watching television? Not at all   Moving or speaking so slowly that other people could have noticed? Or the opposite - being so fidgety or restless that you have been moving around a lot more than usual? Not at all   Thoughts that you would be better off dead, or of hurting yourself in some way? Not at all   PHQ-9 Total Score 7   If you checked off any problems, how difficult have these problems made it for you to do your work, take care of things at home, or get along with other people? Somewhat difficult      LEXA-7      Over the last two weeks, how often have you been bothered by the following problems?  Feeling nervous, anxious or on edge: Nearly every day  Not being able to stop or control worrying: More than half the days  Worrying too much about different things: More than half the days  Trouble Relaxing: More than half the days  Being so restless that it is hard to sit still: Several days  Becoming easily annoyed or irritable: More than half the days  Feeling afraid as if something awful might happen: Not at all  LEXA 7 Total Score: 12  If you checked any problems, how difficult have these problems made it for you to do your work, take care of things at home, or get along with other people: Very difficult    Past Psychiatric History:   History of outpatient psychiatrist: denies  Diagnoses: anxiety  History of outpatient therapy: denies  Previous Inpatient hospitalizations: denies  Previous medication trials:   Buspar 15 ineffective             History of suicide/self harm attempts: denies     Abuse/trauma History:              Physical: denies              Sexual: denies              Emotional/Neglect: denies              Significant death/loss: denies              Other trauma: denies              Head Injury:denies    Triggers:  "(Persons/Places/Things/Events/Thought/Emotions): school. stress    Substance Abuse History/Last use:              Alcohol: 3 drinks per week              Tobacco/Vape: none              Illicit Drugs: Marijuana 1x week              Seizures:none              Caffeine: 1 energy drink per week    Legal History:     Work History:               Highest level of education obtained: college; Jr at [employer]               History? no              Patient's Occupation: student    Interpersonal/Relational:              Marital Status: not               Support system:  father     Social History:  Where was patient born: [place]  Upbringing: parents  Where does patient currently live: [facility]  Living situation: near campus with 3 room mates  Leisure and Recreation: friends  Buddhist: none     Family History:   Family History   Problem Relation Age of Onset    No Known Problems Mother     Hypertension Father     Arthritis Maternal Grandmother     Heart disease Maternal Grandfather     Heart attack Maternal Grandfather     Diabetes Maternal Grandfather     Cancer Maternal Grandfather     Diabetes Paternal Grandmother     Hypertension Paternal Grandfather     Cancer Paternal Grandfather        Family Psychiatric History:   Psych Diagnosis: brother-ADHD  History of suicide/self harm attempts: denies  History of Substance abuse: denies      Past Medical History:   Past Medical History:   Diagnosis Date    Anxiety 2024       Past Surgical History:   Past Surgical History:   Procedure Laterality Date    NO PAST SURGERIES         Medications:   No current outpatient medications on file.    Medication Considerations:  EMILY reviewed and appropriate.      Allergies:   No Known Allergies      Objective     Physical Exam    Vital Signs:   Vitals:    12/18/24 1105   BP: 118/72   Pulse: 92   SpO2: Comment: unable to obtain. artifical nails   Weight: 54.8 kg (120 lb 14.4 oz)   Height: 177.8 cm (70\")     Body mass index is " 17.35 kg/m².     Mental Status Exam:   MENTAL STATUS EXAM   General Appearance:  Cleanly groomed and dressed and well developed  Attitude:  Cooperative  Motor Activity:  Normal gait, posture  Muscle Strength:  Normal  Speech:  Normal rate, tone, volume  Language:  Spontaneous  Mood and affect:  Normal, pleasant and anxious  Hopelessness:  Denies  Loneliness: Denies  Thought Process:  Logical and goal-directed  Associations/ Thought Content:  No delusions  Hallucinations:  None  Suicidal Ideations:  Not present  Homicidal Ideation:  Not present  Sensorium:  Alert and clear  Orientation:  Person, place, time and situation  Immediate Recall, Recent, and Remote Memory:  Intact  Attention Span/ Concentration:  Good  Fund of Knowledge:  Appropriate for age and educational level  Intellectual Functioning:  Average range  Insight:  Good  Judgement:  Good  Reliability:  Good  Impulse Control:  Good         @RESULASTCBCDIFFPANEL,TSH,LABLIPI,XUEUAWDI01,GSFVKTBL51,MG,FOLATE,PROLACTIN,CRPRESULT,CMP,F3QUDHUOHTZ)@    Lab Results   Component Value Date    GLUCOSE 117 (H) 09/28/2024    BUN 6 09/28/2024    CREATININE 0.83 09/28/2024    EGFR 103.6 09/28/2024    BCR 7.2 09/28/2024    K 3.7 09/28/2024    CO2 24.0 09/28/2024    CALCIUM 9.2 09/28/2024    ALBUMIN 5.0 09/28/2024    BILITOT 0.5 09/28/2024    AST 19 09/28/2024    ALT 13 09/28/2024       Lab Results   Component Value Date    WBC 5.28 09/28/2024    HGB 14.7 09/28/2024    HCT 43.6 09/28/2024    MCV 95.0 09/28/2024     09/28/2024      Latest Reference Range & Units 09/28/24 03:57   TSH Baseline 0.270 - 4.200 uIU/mL 3.370      9/28/2024    Test Reason : ED Triage Standing Order~  Blood Pressure :   */*   mmHG  Vent. Rate : 104 BPM     Atrial Rate : 104 BPM     P-R Int : 158 ms          QRS Dur :  86 ms      QT Int : 350 ms       P-R-T Axes :  77  97  48 degrees     QTc Int : 460 ms   Sinus tachycardia  Rightward axis  Borderline ECG  No previous ECGs available  Confirmed by  "Aaron Borges ([id number]) on 9/29/2024 7:06:39 AM      No results found for: \"CHOL\", \"CHLPL\", \"TRIG\", \"HDL\", \"LDL\"    Results  Laboratory Studies  TSH was normal.    Testing  EKG showed a slightly prolonged QTc.           Assessment / Plan      Visit Diagnosis/Orders Placed This Visit:  .Diagnoses and all orders for this visit:    1. Generalized anxiety disorder with panic attacks (Primary)  -     propranolol (INDERAL) 10 MG tablet; Take 1-2 tablets by mouth Daily As Needed (anxiety).  Dispense: 120 tablet; Refill: 0  -     escitalopram (Lexapro) 10 MG tablet; Take 0.5 tablets by mouth Daily for 7 days, THEN 1 tablet Daily for 53 days.  Dispense: 57 tablet; Refill: 0    2. Moderate episode of recurrent major depressive disorder  -     escitalopram (Lexapro) 10 MG tablet; Take 0.5 tablets by mouth Daily for 7 days, THEN 1 tablet Daily for 53 days.  Dispense: 57 tablet; Refill: 0    3. Encounter for therapeutic drug monitoring  -     ToxAssure Flex 23, Ur - Urine, Clean Catch    Other orders  -     ondansetron (Zofran) 4 MG tablet; Take 1 tablet by mouth Daily.  Dispense: 30 tablet; Refill: 0       Assessment & Plan  1. Anxiety.  She reports a history of anxiety since high school, with worsening symptoms over the past year. She experiences daily anxiety, rated at a 9/10, and has had panic attacks, including one yesterday. She has tried BuSpar 15 mg daily in the past but found it ineffective. Lexapro (escitalopram) 5 mg daily for 1 week, then increasing to 10 mg daily, has been prescribed. Propranolol 10-20 mg daily as needed for anxiety has also been prescribed. She is advised to take Zofran 4 mg as needed for nausea. Potential side effects, including gastrointestinal upset, have been discussed. She is encouraged to maintain regular meals to prevent hypoglycemia. A referral for ACT therapy has been suggested for future consideration.    2. Depression.  She reports a depressed mood, with a depression score ranging from " 5-8/10. She has difficulty getting out of bed, reduced appetite, and decreased interest in daily activities. Lexapro (escitalopram) 5 mg daily for 1 week, then increasing to 10 mg daily, has been prescribed. She is advised to monitor for any worsening of symptoms and to contact her father daily for support. Potential side effects, including nausea and gastrointestinal upset, have been discussed. She is encouraged to maintain regular meals to prevent hypoglycemia. A referral for ACT therapy has been suggested for future consideration.    3. Syncope.  She has experienced fainting spells, initially suspected to be POTS but later attributed to vasovagal syncope. Differential diagnosis includes low blood sugar, vasovagal response, blockages, and seizures. However, it does not sound like a seizure. Her TSH was normal. EKG showed a slightly prolonged QTc. She is advised to keep her blood sugar up by snacking frequently. She is also advised to monitor her heart rate and report any significant changes. A beta-blocker, propranolol 10-20 mg daily as needed, has been prescribed to manage anxiety-related symptoms.    PLAN:  -Start Lexapro 10 mg half tablet daily for 1 week then increase to 10 mg if tolerated  -Start propranolol 10-20 mg daily as needed for anxiety  -Zofran 4 mg daily as needed for medication related nausea  -Labs and Thang reviewed    -Nonmedicinal methods used to decrease anxiety and improve sleep were discussed at length. These include benefits of decreased caffeine consumption, utilization of a weighted blanket, avoiding screen two hours prior to sleep or using blue blocker glasses, sleeping in a dark room, avoid daytime naps,  use bed only for sleeping, and using  increasing daytime activity as permitted. Melatonin 1-5 mg HS prn or Magnesium Glycinate up to 400 mg HS prn can be used to aid sleep.    -The benefits  of consuming a healthy diet, minimizing caffeine intake and engaging in  daily exercise were  discussed with patient. Patient encouraged to consider lifestyle modification regarding diet and exercise patterns to maximize results of mental health treatment.    -Patient advised to refrain from THC use. Negative long term effects reviewed including but not limited to:  potential interruption of brain connectivity,  poor memory, impaired cognition, psychosis fall, oversedation especially when used with opioids. Use of THC may predispose patient to psychosis and increase anxiety    Impression/Formulation: Patient appears alert and oriented. Treatment and medication options discussed during today's visit.  Opportunity provided for any necessary clarification and patient questions.  Patient acknowledges and verbally consents to proceed with mutually agreed upon treatment plan. Patient is voluntarily requesting to begin outpatient treatment at [facility] [address].  Patient is receptive to assistance with maintaining a stable lifestyle.  Patient presents with history of     ICD-10-CM ICD-9-CM   1. Generalized anxiety disorder with panic attacks  F41.1 300.02    F41.0 300.01   2. Moderate episode of recurrent major depressive disorder  F33.1 296.32   3. Encounter for therapeutic drug monitoring  Z51.81 V58.83   .     Treatment Plan: Patient will pursue/Continue supportive psychotherapy and take medications as prescribed. Provider instructed patient to obtain psychiatric medication from this provider only to prevent polypharmacy and possible overprescribing or unsafe medication combinations. Patient agrees to contact this office, call 911 or present to the nearest emergency room should suicidal or homicidal ideations occur. Discussed medication options and treatment plan of prescribed medication(s) as well as the risks, benefits, and potential side effects. Patient ackowledged and verbally consents to continue with mutually agreed upon   treatment plan and was educated on the importance of  compliance with treatment and follow-up appointments.     MEDICATION Treatment: Discussed medication treatment options and plan of prescribed medication. Potential risks, benefits, and side effects including but not limited to the following reviewed: Black Box warnings, worsening symptoms, SI, sedation, GI side effects, metabolic alterations and blood pressure fluctuations. Patient is reminded to refrain from illicit substance use, including alcohol and THC while taking medications. Also advised to refrain from activity requiring  alertness until sedative effects of medication are assessed.     SSRI blackbox warning  Provider discussed medicinal and nonmedicinal treatment options for treatment of anxiety/depression in patient under age 25, including psychotherapy alone, psychotherapy with SSRI, or SSRI without psychotherapy.  Provider discussed the evidence supporting use of psychotherapy to develop coping skills without the risk of medication side effects in addition to efficacy of combined treatment using therapy and medication. Lengthy conversation regarding the influence of hormone fluctuations on mood symptoms, impulsivity and increased risk of worsening symptoms and suicidality with use of  SSRI medication in adolescence.  Patient/Guardian educated on Black Box Warning of increased suicidal thoughts and behaviors occurring with use of SSRIs in those under age 25 and advised patient must be monitored closely for subtle signs of worsening depression and/or agitation when SSRI medications are initiated. Appropriate safety precautions should be taken including securing firearms and medications out of patient's reach. Provider should be contacted immediately and patient taken to ED should  SI/HI occur. Provider encouraged patient and/or guardian to weigh risks versus benefits of medication management carefully.    Patient and/or guardian verbalize understanding of risks associated with use of SSRI medication and  believe medication is their best treatment option at this time. Guardian and/or patient agrees to monitor for and report worsening symptoms or intolerable side effects and understands patient must return for a two week follow up appointment unless otherwise indicated and agreed upon.        Prognosis: Good with Ongoing Treatment  Unique factors influencing symptom alleviation/remission include: pre-existing conditions, symptom chronicity, symptom severity, degree of impairment, social support, financial security, motivation, patient engagement and medication adherence. Prognosis is largely dependent  on patient's adherence to medication treatment plan, follow up appointments and willingness to engage in psychotherapy    Functional Status: Mild impairment      Short-term goals: Patient will adhere to medication regimen and experience continued improvement in symptoms over the next 3 months.   Long-term goals: Patient will adhere to medication treatment plan and report improvement in symptoms over the next 6 months      Quality Measures:     TOBACCO USE:  Tobacco Use: Low Risk  (12/18/2024)    Patient History     Smoking Tobacco Use: Never     Smokeless Tobacco Use: Never     Passive Exposure: Never      Never smoker    I advised Celeste of the risks of tobacco use.     Follow Up:   Return in about 1 month (around 1/20/2025).    Yohana Mcdonald [facility] [address]       Patient or patient representative verbalized consent for the use of Ambient Listening during the visit with  RACHEL Mccarthy for chart documentation. 12/18/2024  15:43 EST

## 2024-12-26 LAB
1OH-MIDAZOLAM UR QL SCN: NOT DETECTED NG/MG CREAT
6MAM UR QL SCN: NEGATIVE NG/ML
7AMINOCLONAZEPAM/CREAT UR: NOT DETECTED NG/MG CREAT
A-OH ALPRAZ/CREAT UR: NOT DETECTED NG/MG CREAT
A-OH-TRIAZOLAM/CREAT UR CFM: NOT DETECTED NG/MG CREAT
ACP UR QL CFM: NOT DETECTED
ALPRAZ/CREAT UR CFM: NOT DETECTED NG/MG CREAT
AMPHETAMINES UR QL SCN: NEGATIVE NG/ML
APAP UR QL SCN: NORMAL UG/ML
APAP UR QL: NORMAL
APAP UR-MCNC: PRESENT UG/ML
BARBITURATES UR QL SCN: NEGATIVE NG/ML
BENZODIAZ SCN METH UR: NEGATIVE
BUPRENORPHINE UR QL SCN: NEGATIVE
BUPRENORPHINE/CREAT UR: NOT DETECTED NG/MG CREAT
CANNABINOIDS UR QL CFM: NORMAL
CANNABINOIDS UR QL SCN: NORMAL NG/ML
CARBOXYTHC UR CFM-MCNC: >543 NG/MG CREAT
CARISOPRODOL UR QL: NEGATIVE NG/ML
CLONAZEPAM/CREAT UR CFM: NOT DETECTED NG/MG CREAT
COCAINE+BZE UR QL SCN: NEGATIVE NG/ML
CREAT UR-MCNC: 184 MG/DL
D-METHORPHAN UR-MCNC: NOT DETECTED NG/ML
D-METHORPHAN+LEVORPHANOL UR QL: NOT DETECTED
DESALKYLFLURAZ/CREAT UR: NOT DETECTED NG/MG CREAT
DIAZEPAM/CREAT UR: NOT DETECTED NG/MG CREAT
ETG ETS UR QL CFM: NORMAL
ETHANOL UR CFM-MCNC: 0.07 G/DL
ETHANOL UR QL SCN: NORMAL G/DL
ETHANOL UR QL SCN: NORMAL NG/ML
ETHANOL UR QL: NORMAL
ETHYL GLUCURONIDE UR CFM-MCNC: NORMAL NG/MG CREAT
ETHYL SULFATE UR CFM-MCNC: NORMAL NG/MG CREAT
FENTANYL CTO UR SCN-MCNC: NEGATIVE NG/ML
FENTANYL/CREAT UR: NOT DETECTED NG/MG CREAT
FLUNITRAZEPAM UR QL SCN: NOT DETECTED NG/MG CREAT
GABAPENTIN UR-MCNC: NEGATIVE UG/ML
HALLUCINOGENS UR: NEGATIVE
HYPNOTICS UR QL SCN: NEGATIVE
KETAMINE UR QL: NOT DETECTED
LORAZEPAM/CREAT UR: NOT DETECTED NG/MG CREAT
MEPERIDINE UR QL SCN: NEGATIVE NG/ML
METHADONE UR QL SCN: NEGATIVE NG/ML
METHADONE+METAB UR QL SCN: NEGATIVE NG/ML
MIDAZOLAM/CREAT UR CFM: NOT DETECTED NG/MG CREAT
MISCELLANEOUS, UR: NEGATIVE
NORBUPRENORPHINE/CREAT UR: NOT DETECTED NG/MG CREAT
NORDIAZEPAM/CREAT UR: NOT DETECTED NG/MG CREAT
NORFENTANYL/CREAT UR: NOT DETECTED NG/MG CREAT
NORFLUNITRAZEPAM UR-MCNC: NOT DETECTED NG/MG CREAT
NORKETAMINE UR-MCNC: NOT DETECTED UG/ML
OPIATES UR SCN-MCNC: NEGATIVE NG/ML
OXAZEPAM/CREAT UR: NOT DETECTED NG/MG CREAT
OXYCODONE CTO UR SCN-MCNC: NEGATIVE NG/ML
PCP UR QL SCN: NEGATIVE NG/ML
PRESCRIBED MEDICATIONS: NORMAL
PROPOXYPH UR QL SCN: NEGATIVE NG/ML
TAPENTADOL CTO UR SCN-MCNC: NEGATIVE NG/ML
TEMAZEPAM/CREAT UR: NOT DETECTED NG/MG CREAT
TRAMADOL UR QL SCN: NEGATIVE NG/ML
ZALEPLON UR-MCNC: NOT DETECTED NG/ML
ZOLPIDEM PHENYL-4-CARB UR QL SCN: NOT DETECTED
ZOLPIDEM UR QL SCN: NOT DETECTED
ZOPICLONE-N-OXIDE UR-MCNC: NOT DETECTED NG/ML

## 2025-01-20 ENCOUNTER — TELEMEDICINE (OUTPATIENT)
Age: 20
End: 2025-01-20
Payer: COMMERCIAL

## 2025-01-20 VITALS — WEIGHT: 115 LBS | BODY MASS INDEX: 16.5 KG/M2

## 2025-01-20 DIAGNOSIS — R11.0 NAUSEA: ICD-10-CM

## 2025-01-20 DIAGNOSIS — F33.1 MODERATE EPISODE OF RECURRENT MAJOR DEPRESSIVE DISORDER: ICD-10-CM

## 2025-01-20 DIAGNOSIS — F41.1 GENERALIZED ANXIETY DISORDER WITH PANIC ATTACKS: Primary | ICD-10-CM

## 2025-01-20 DIAGNOSIS — F41.0 GENERALIZED ANXIETY DISORDER WITH PANIC ATTACKS: Primary | ICD-10-CM

## 2025-01-20 PROCEDURE — 99214 OFFICE O/P EST MOD 30 MIN: CPT | Performed by: NURSE PRACTITIONER

## 2025-01-20 PROCEDURE — 96127 BRIEF EMOTIONAL/BEHAV ASSMT: CPT | Performed by: NURSE PRACTITIONER

## 2025-01-20 RX ORDER — ESCITALOPRAM OXALATE 10 MG/1
10 TABLET ORAL DAILY
Qty: 90 TABLET | Refills: 0 | Status: SHIPPED | OUTPATIENT
Start: 2025-01-20

## 2025-01-20 RX ORDER — ONDANSETRON 4 MG/1
4 TABLET, FILM COATED ORAL DAILY
Qty: 30 TABLET | Refills: 0 | Status: SHIPPED | OUTPATIENT
Start: 2025-01-20

## 2025-01-20 NOTE — PROGRESS NOTES
Office  Follow Up Visit    This provider is located at  [facility], [facility], located at [address] [address], [facility], [address] and is conducting  a secure MyChart Video Visit through [email or web address]. Patient is being evaluated today  at their home address in [place], and states they are  in a secure environment for this appointment. The patient consents to be seen remotely, and when consent is given they understand that the consent allows for patient identifiable information to be sent to a third party as needed. They may refuse to be seen remotely at any time. The electronic data is encrypted and password protected, and the patient  has been advised of the potential risks to privacy not withstanding such measures. The patient's condition being diagnosed/treated is appropriate for telemedicine. The provider identified herself as well as her credentials to patient. Patient identity confirmed by asking to confirm their name and       Patient Name: Celeste Johnson  : 2005   MRN: [id number]     Referring Provider: Gisela Elizabeth MD    Chief Complaint:       ICD-10-CM ICD-9-CM   1. Generalized anxiety disorder with panic attacks  F41.1 300.02    F41.0 300.01   2. Moderate episode of recurrent major depressive disorder  F33.1 296.32   3. Nausea  R11.0 787.02         Celeste Johnson is a 19 y.o. female who presents for virtual follow-up visit related to anxiety and depression.  She was last seen 2024 at which time she was started on Lexapro and propranolol  History of Present Illness  . She reports that the beta blocker has been beneficial in managing her stress levels, but she has not observed significant changes from the Lexapro. She is uncertain if this is due to the short duration of use or other factors. She has not experienced any side effects from the medications, although she initially had minor gastrointestinal issues, which were resolved with Zofran.  She is currently taking the full dose of Lexapro/10 mg. She rates her depression at 5 out of 10 and her anxiety at 7 out of 10, which decreases to 5 with propranolol. She usually takes one propranolol but believes she could increase the dose to two if necessary.     She has been waking up early and struggling to fall back asleep, often waking up around 4:00 AM and staying awake for an hour before falling back asleep until 9:00 AM or 10:00 AM. Her daily routine includes classes from 9:30 AM to 4:30 PM on Mondays, Wednesdays, and Fridays, with no classes on Tuesdays and Thursdays. Her appetite has been low, but she notices an increase when she takes her medication.  She has been experiencing fatigue recently, which she attributes to increased sleep. For instance, she woke up at 9:00 AM yesterday, returned to bed at 12:30 PM, and slept until 5:00 PM. She is unsure if this is a side effect of the medication or a result of stress. She typically takes Lexapro at night after a substantial meal. She typically consumes one full meal per day and snacks throughout the day. Her current weight is approximately 115 pounds, and she reports no health changes since her last visit. She has observed a decrease in fainting episodes, although they still occur occasionally. She expresses a desire to continue with the current treatment plan and monitor its effectiveness.  .     Diagnosis:anxiety  Medications: none  Therapy: none    Subjective      Review of Systems:   Review of Systems   Constitutional:  Positive for activity change, appetite change and fatigue.   Psychiatric/Behavioral:  Positive for dysphoric mood and sleep disturbance. The patient is nervous/anxious.        PHQ-9 Depression Screening  Little interest or pleasure in doing things? (Patient-Rptd) Several days   Feeling down, depressed, or hopeless? (Patient-Rptd) Not at all   PHQ-2 Total Score (Patient-Rptd) 1   Trouble falling or staying asleep, or sleeping too much?  (Patient-Rptd) Several days   Feeling tired or having little energy? (Patient-Rptd) Several days   Poor appetite or overeating? (Patient-Rptd) Several days   Feeling bad about yourself - or that you are a failure or have let yourself or your family down? (Patient-Rptd) Not at all   Trouble concentrating on things, such as reading the newspaper or watching television? (Patient-Rptd) Several days   Moving or speaking so slowly that other people could have noticed? Or the opposite - being so fidgety or restless that you have been moving around a lot more than usual? (Patient-Rptd) Not at all   Thoughts that you would be better off dead, or of hurting yourself in some way? (Patient-Rptd) Not at all   PHQ-9 Total Score (Patient-Rptd) 5   If you checked off any problems, how difficult have these problems made it for you to do your work, take care of things at home, or get along with other people? (Patient-Rptd) Somewhat difficult       LEXA-7      Over the last two weeks, how often have you been bothered by the following problems?  Feeling nervous, anxious or on edge: (Patient-Rptd) Several days  Not being able to stop or control worrying: (Patient-Rptd) Several days  Worrying too much about different things: (Patient-Rptd) Several days  Trouble Relaxing: (Patient-Rptd) Several days  Being so restless that it is hard to sit still: (Patient-Rptd) Not at all  Becoming easily annoyed or irritable: (Patient-Rptd) Several days  Feeling afraid as if something awful might happen: (Patient-Rptd) Not at all  LEXA 7 Total Score: (Patient-Rptd) 5  If you checked any problems, how difficult have these problems made it for you to do your work, take care of things at home, or get along with other people: (Patient-Rptd) Somewhat difficult    Patient History:   The following portions of the patient's history were reviewed and updated as appropriate: allergies, current medications, past family history, past medical history, past social  history, past surgical history and problem list.     Social History     Socioeconomic History    Marital status: Single   Tobacco Use    Smoking status: Never     Passive exposure: Never    Smokeless tobacco: Never   Vaping Use    Vaping status: Never Used   Substance and Sexual Activity    Alcohol use: Yes     Alcohol/week: 3.0 standard drinks of alcohol     Types: 3 Cans of beer per week    Drug use: Yes     Types: Marijuana    Sexual activity: Defer     Past Psychiatric History:   History of outpatient psychiatrist: denies  Diagnoses: anxiety  History of outpatient therapy: denies  Previous Inpatient hospitalizations: denies  Previous medication trials:   Buspar 15 ineffective             History of suicide/self harm attempts: denies     Medications:     Current Outpatient Medications:     ondansetron (Zofran) 4 MG tablet, Take 1 tablet by mouth Daily., Disp: 30 tablet, Rfl: 0    propranolol (INDERAL) 10 MG tablet, Take 1-2 tablets by mouth Daily As Needed (anxiety)., Disp: 120 tablet, Rfl: 0    escitalopram (Lexapro) 10 MG tablet, Take 1 tablet by mouth Daily., Disp: 90 tablet, Rfl: 0    Objective     Physical Exam    Vital Signs:   Vitals:    01/20/25 1222   Weight: 52.2 kg (115 lb)     Body mass index is 16.5 kg/m².       Mental Status Exam:   MENTAL STATUS EXAM   General Appearance:  Cleanly groomed and dressed and well developed  Eye Contact:  Good eye contact  Attitude:  Cooperative  Motor Activity:  Other  Other Comment:  Sitting during visit  Muscle Strength:  Normal  Speech:  Normal rate, tone, volume  Language:  Spontaneous  Mood and affect:  Normal, pleasant  Hopelessness:  Denies  Loneliness: Denies  Thought Process:  Logical and goal-directed  Associations/ Thought Content:  No delusions  Hallucinations:  None  Suicidal Ideations:  Not present  Homicidal Ideation:  Not present  Sensorium:  Alert and clear  Orientation:  Person, place, time and situation  Immediate Recall, Recent, and Remote Memory:   "Intact  Attention Span/ Concentration:  Good  Fund of Knowledge:  Appropriate for age and educational level  Intellectual Functioning:  Average range  Insight:  Good  Judgement:  Good  Reliability:  Good  Impulse Control:  Good       @RESULASTCBCDIFFPANEL,TSH,LABLIPI,[id number],DUOREVUD50,MG,FOLATE,PROLACTIN,CRPRESULT,CMP,[id number])@    Lab Results   Component Value Date    GLUCOSE 117 (H) 09/28/2024    BUN 6 09/28/2024    CREATININE 0.83 09/28/2024    EGFR 103.6 09/28/2024    BCR 7.2 09/28/2024    K 3.7 09/28/2024    CO2 24.0 09/28/2024    CALCIUM 9.2 09/28/2024    ALBUMIN 5.0 09/28/2024    BILITOT 0.5 09/28/2024    AST 19 09/28/2024    ALT 13 09/28/2024       Lab Results   Component Value Date    WBC 5.28 09/28/2024    HGB 14.7 09/28/2024    HCT 43.6 09/28/2024    MCV 95.0 09/28/2024     09/28/2024       No results found for: \"CHOL\", \"CHLPL\", \"TRIG\", \"HDL\", \"LDL\"    Results             Assessment / Plan      Visit Diagnosis/Orders Placed This Visit:  Diagnoses and all orders for this visit:    1. Generalized anxiety disorder with panic attacks (Primary)  Comments:  Continue propranolol 10 to 20 mg daily as needed for anxiety  Orders:  -     escitalopram (Lexapro) 10 MG tablet; Take 1 tablet by mouth Daily.  Dispense: 90 tablet; Refill: 0    2. Moderate episode of recurrent major depressive disorder  -     escitalopram (Lexapro) 10 MG tablet; Take 1 tablet by mouth Daily.  Dispense: 90 tablet; Refill: 0    3. Nausea  -     ondansetron (Zofran) 4 MG tablet; Take 1 tablet by mouth Daily.  Dispense: 30 tablet; Refill: 0       Assessment & Plan  1. Depression.  She has been on an increased dose of Lexapro 10 mg for about 3 weeks. It may take 6 to 8 weeks to see the full benefit. She reports feeling very tired and having irregular sleep patterns, which could be related to the medication. She also mentions a low appetite but is managing to eat one full meal a day. She will continue taking Lexapro 10 mg at night " to help manage potential side effects like nausea and sleep disturbances. A refill for Lexapro 10 mg has been provided.    2. Anxiety.  She reports her anxiety level is at a 7 out of 10 but reduces to a 5 with propranolol. She has been taking propranolol 10-20 mg as needed and finds it effective. She is advised to try taking two propranolol tablets during high-stress events to see if it provides better relief.    Follow-up  The patient will follow up on 02/20/2025 at 12:45 PM.       Plan:   Continue Lexapro 10 mg daily  -Continue propranolol 10-20 mg daily as needed for anxiety  -Zofran 4 mg daily as needed for medication related nausea  -Labs and Htang reviewed    Continue supportive psychotherapy efforts and medications as indicated. Treatment and medication options discussed during today's visit. Patient ackowledged and verbally consented to continue with current treatment plan and was educated on the importance of compliance with treatment and follow-up appointments. Patient seems reasonably able to adhere to treatment plan.      Medication Considerations:  Discussed medication options and treatment plan of prescribed medication(s) as well as the risks, benefits, and potential side effects. Patient is agreeable to call the office with any worsening of symptoms or onset of side effects. Patient is agreeable to call 911 or go to the nearest ER should he/she begin having SI/HI.    Quality Measures:   Never smoker    I advised Celeste Johnson of the risks of tobacco use.     Follow Up:   Return in about 4 weeks (around 2/17/2025).      RACHEL Young, [facility]    Patient or patient representative verbalized consent for the use of Ambient Listening during the visit with  RACHEL Mccarthy for chart documentation. 1/20/2025  12:18 EST

## 2025-02-20 ENCOUNTER — OFFICE VISIT (OUTPATIENT)
Age: 20
End: 2025-02-20
Payer: COMMERCIAL

## 2025-02-20 VITALS
BODY MASS INDEX: 17.67 KG/M2 | OXYGEN SATURATION: 98 % | SYSTOLIC BLOOD PRESSURE: 100 MMHG | DIASTOLIC BLOOD PRESSURE: 68 MMHG | WEIGHT: 123.4 LBS | HEIGHT: 70 IN | HEART RATE: 67 BPM

## 2025-02-20 DIAGNOSIS — F33.1 MODERATE EPISODE OF RECURRENT MAJOR DEPRESSIVE DISORDER: ICD-10-CM

## 2025-02-20 DIAGNOSIS — R11.0 NAUSEA: ICD-10-CM

## 2025-02-20 DIAGNOSIS — F41.1 GENERALIZED ANXIETY DISORDER WITH PANIC ATTACKS: Primary | ICD-10-CM

## 2025-02-20 DIAGNOSIS — F41.0 GENERALIZED ANXIETY DISORDER WITH PANIC ATTACKS: Primary | ICD-10-CM

## 2025-02-20 RX ORDER — PROPRANOLOL HYDROCHLORIDE 10 MG/1
10-20 TABLET ORAL DAILY PRN
Qty: 120 TABLET | Refills: 0 | Status: SHIPPED | OUTPATIENT
Start: 2025-02-20

## 2025-02-20 RX ORDER — ONDANSETRON 4 MG/1
4 TABLET, FILM COATED ORAL DAILY
Qty: 30 TABLET | Refills: 0 | Status: SHIPPED | OUTPATIENT
Start: 2025-02-20

## 2025-02-20 RX ORDER — ESCITALOPRAM OXALATE 20 MG/1
20 TABLET ORAL DAILY
Qty: 90 TABLET | Refills: 0 | Status: SHIPPED | OUTPATIENT
Start: 2025-02-20

## 2025-02-20 NOTE — PROGRESS NOTES
"     Office  Follow Up Visit       Patient Name: Celeste Johnson  : 2005   MRN: [id number]     Referring Provider: Gisela Elizabeth MD    Chief Complaint:       ICD-10-CM ICD-9-CM   1. Generalized anxiety disorder with panic attacks  F41.1 300.02    F41.0 300.01   2. Moderate episode of recurrent major depressive disorder  F33.1 296.32        Celeste Johnson is a 19 y.o. female who presents for virtual follow-up visit related to anxiety and depression.  She was last seen 25 at which time she was continued on Lexapro 10 mg daily and propranolol  History of Present Illness    Patient reports she does not notice an appreciable difference since increasing her dose of Lexapro however she finds that propranolol helpful when engaging in social activities.  Reports she is still \"passing out \"but these instances have decreased in frequency.  She does take propranolol prior to engaging socially and again finds it helpful.  She reports it is difficult to gauge her mood as she feels she puts on a \"front\" and often confuses reality with how she presents herself.  PHQ score 6 and LEXA-7 is 7, slightly increased from 5 and 5 last visit.  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  She sometimes naps during the day and notes she wakes feeling startled.  She is unsure why this is other than she feels safe when she is asleep and equates getting up with being unsafe.  Nausea and appetite have  improved with Zofran .  Patient reports she feels most nauseous when she is extremely anxious.  She smokes marijuana a couple times a week to help aid with anxiety insomnia and anorexia.  She denies any health changes at this time.    Diagnosis:anxiety  Medications: none  Therapy: none    Subjective      Review of Systems:   Review of Systems   Constitutional:  Positive for activity change, appetite change and fatigue.   Psychiatric/Behavioral:  Positive for dysphoric " mood and sleep disturbance. The patient is nervous/anxious.        PHQ-9 Depression Screening  Little interest or pleasure in doing things? Several days   Feeling down, depressed, or hopeless? Not at all   PHQ-2 Total Score 1   Trouble falling or staying asleep, or sleeping too much? Over half   Feeling tired or having little energy? Several days   Poor appetite or overeating? Several days   Feeling bad about yourself - or that you are a failure or have let yourself or your family down? Not at all   Trouble concentrating on things, such as reading the newspaper or watching television? Several days   Moving or speaking so slowly that other people could have noticed? Or the opposite - being so fidgety or restless that you have been moving around a lot more than usual? Not at all   Thoughts that you would be better off dead, or of hurting yourself in some way? Not at all   PHQ-9 Total Score 6   If you checked off any problems, how difficult have these problems made it for you to do your work, take care of things at home, or get along with other people? Somewhat difficult       LEXA-7      Over the last two weeks, how often have you been bothered by the following problems?  Feeling nervous, anxious or on edge: More than half the days  Not being able to stop or control worrying: Several days  Worrying too much about different things: More than half the days  Trouble Relaxing: Several days  Being so restless that it is hard to sit still: Not at all  Becoming easily annoyed or irritable: Several days  Feeling afraid as if something awful might happen: Not at all  LEXA 7 Total Score: 7  If you checked any problems, how difficult have these problems made it for you to do your work, take care of things at home, or get along with other people: Somewhat difficult    Patient History:   The following portions of the patient's history were reviewed and updated as appropriate: allergies, current medications, past family history, past  "medical history, past social history, past surgical history and problem list.     Social History     Socioeconomic History    Marital status: Single   Tobacco Use    Smoking status: Never     Passive exposure: Never    Smokeless tobacco: Never   Vaping Use    Vaping status: Never Used   Substance and Sexual Activity    Alcohol use: Yes     Alcohol/week: 3.0 standard drinks of alcohol     Types: 3 Cans of beer per week    Drug use: Yes     Types: Marijuana    Sexual activity: Defer     Past Psychiatric History:   History of outpatient psychiatrist: denies  Diagnoses: anxiety  History of outpatient therapy: denies  Previous Inpatient hospitalizations: denies  Previous medication trials:   Buspar 15 ineffective             History of suicide/self harm attempts: denies     Medications:     Current Outpatient Medications:     escitalopram (Lexapro) 10 MG tablet, Take 1 tablet by mouth Daily., Disp: 90 tablet, Rfl: 0    ondansetron (Zofran) 4 MG tablet, Take 1 tablet by mouth Daily., Disp: 30 tablet, Rfl: 0    propranolol (INDERAL) 10 MG tablet, Take 1-2 tablets by mouth Daily As Needed (anxiety)., Disp: 120 tablet, Rfl: 0    Objective     Physical Exam    Vital Signs:   Vitals:    02/20/25 1227   Weight: 56 kg (123 lb 6.4 oz)   Height: 177.8 cm (70\")       Body mass index is 17.71 kg/m².       Mental Status Exam:   MENTAL STATUS EXAM   General Appearance:  Cleanly groomed and dressed and well developed  Eye Contact:  Good eye contact  Attitude:  Cooperative  Motor Activity:  Other  Other Comment:  Sitting during visit  Muscle Strength:  Normal  Speech:  Normal rate, tone, volume  Language:  Spontaneous  Mood and affect:  Normal, pleasant  Hopelessness:  Denies  Loneliness: Denies  Thought Process:  Logical and goal-directed  Associations/ Thought Content:  No delusions  Hallucinations:  None  Suicidal Ideations:  Not present  Homicidal Ideation:  Not present  Sensorium:  Alert and clear  Orientation:  Person, place, time " "and situation  Immediate Recall, Recent, and Remote Memory:  Intact  Attention Span/ Concentration:  Good  Fund of Knowledge:  Appropriate for age and educational level  Intellectual Functioning:  Average range  Insight:  Good  Judgement:  Good  Reliability:  Good  Impulse Control:  Good       @RESULASTCBCDIFFPANEL,TSH,LABLIPI,POSXSCDJ52,GPQUOIAY58,MG,FOLATE,PROLACTIN,CRPRESULT,CMP,[id number])@    Lab Results   Component Value Date    GLUCOSE 117 (H) 09/28/2024    BUN 6 09/28/2024    CREATININE 0.83 09/28/2024    EGFR 103.6 09/28/2024    BCR 7.2 09/28/2024    K 3.7 09/28/2024    CO2 24.0 09/28/2024    CALCIUM 9.2 09/28/2024    ALBUMIN 5.0 09/28/2024    BILITOT 0.5 09/28/2024    AST 19 09/28/2024    ALT 13 09/28/2024       Lab Results   Component Value Date    WBC 5.28 09/28/2024    HGB 14.7 09/28/2024    HCT 43.6 09/28/2024    MCV 95.0 09/28/2024     09/28/2024       No results found for: \"CHOL\", \"CHLPL\", \"TRIG\", \"HDL\", \"LDL\"    Results             Assessment / Plan      Visit Diagnosis/Orders Placed This Visit:  Diagnoses and all orders for this visit:    1. Generalized anxiety disorder with panic attacks (Primary)  -     propranolol (INDERAL) 10 MG tablet; Take 1-2 tablets by mouth Daily As Needed (anxiety).  Dispense: 120 tablet; Refill: 0    2. Moderate episode of recurrent major depressive disorder  -     escitalopram (Lexapro) 20 MG tablet; Take 1 tablet by mouth Daily.  Dispense: 90 tablet; Refill: 0    3. Nausea  -     ondansetron (Zofran) 4 MG tablet; Take 1 tablet by mouth Daily.  Dispense: 30 tablet; Refill: 0         Assessment & Plan   1. Anxiety.  Her anxiety levels remain elevated, with a daily average of 7 to 8 out of 10. She reports that propranolol helps manage her symptoms, particularly in social settings. She also reports nausea associated with anxiety, which is managed with Zofran. She will continue taking propranolol as needed. A prescription for Zofran has been issued to manage " potential nausea from increasing the Lexapro dosage. The dosage of Lexapro will be increased to 15 mg for one week, and if tolerated, further increased to 20 mg the following week. If she experiences severe nausea or intolerance, she will maintain the 15 mg dosage until symptoms subside. A prescription for 90 tablets of Lexapro 20 mg has been sent to her pharmacy. She is advised to contact the office if her symptoms worsen or if she exhibits manic or hypomanic behavior as the Lexapro dosage is increased.    2. Panic attacks.  She continues to experience panic attacks, particularly in social settings, which sometimes lead to fainting. This may be related to a vasovagal response. She is advised to take propranolol before attending social events to help manage these symptoms. A prescription for propranolol has been issued to ensure she has an adequate supply.    3. Depression.  She reports difficulty differentiating between her true mood and the facade she presents in social situations. The increase in Lexapro dosage is also aimed at addressing her depressive symptoms. She is advised to monitor her mood and report any significant changes, particularly any signs of fred or hypomania.    Follow-up  The patient is scheduled for a follow-up visit in 6 weeks, or earlier if necessary.       Plan:   Increase Lexapro to 15 mg daily for 1 to 2 weeks as tolerated then increase to 20 mg as tolerated  Continue propranolol 10-20 mg daily as needed for anxiety  Zofran 4 mg daily as needed for medication related nausea  Labs and Thang reviewed    Continue supportive psychotherapy efforts and medications as indicated. Treatment and medication options discussed during today's visit. Patient ackowledged and verbally consented to continue with current treatment plan and was educated on the importance of compliance with treatment and follow-up appointments. Patient seems reasonably able to adhere to treatment plan.      Medication  Considerations:  Discussed medication options and treatment plan of prescribed medication(s) as well as the risks, benefits, and potential side effects. Patient is agreeable to call the office with any worsening of symptoms or onset of side effects. Patient is agreeable to call 911 or go to the nearest ER should he/she begin having SI/HI.    Quality Measures:   Never smoker    I advised Celeste Johnson of the risks of tobacco use.     Follow Up:   Return in about 6 weeks (around 4/3/2025).      RACHEL Young, [facility]-BC    Patient or patient representative verbalized consent for the use of Ambient Listening during the visit with  RACHEL Mccarthy for chart documentation. 2/20/2025  12:18 EST

## 2025-04-07 ENCOUNTER — TELEMEDICINE (OUTPATIENT)
Age: 20
End: 2025-04-07
Payer: COMMERCIAL

## 2025-04-07 VITALS — BODY MASS INDEX: 17.65 KG/M2 | WEIGHT: 123 LBS

## 2025-04-07 DIAGNOSIS — F41.0 GENERALIZED ANXIETY DISORDER WITH PANIC ATTACKS: Primary | ICD-10-CM

## 2025-04-07 DIAGNOSIS — F41.1 GENERALIZED ANXIETY DISORDER WITH PANIC ATTACKS: Primary | ICD-10-CM

## 2025-04-07 PROCEDURE — 99214 OFFICE O/P EST MOD 30 MIN: CPT | Performed by: NURSE PRACTITIONER

## 2025-04-07 PROCEDURE — 96127 BRIEF EMOTIONAL/BEHAV ASSMT: CPT | Performed by: NURSE PRACTITIONER

## 2025-04-07 RX ORDER — ESCITALOPRAM OXALATE 20 MG/1
20 TABLET ORAL DAILY
Qty: 90 TABLET | Refills: 0 | Status: SHIPPED | OUTPATIENT
Start: 2025-04-07

## 2025-04-07 RX ORDER — PROPRANOLOL HYDROCHLORIDE 10 MG/1
10-20 TABLET ORAL DAILY PRN
Qty: 120 TABLET | Refills: 0 | Status: SHIPPED | OUTPATIENT
Start: 2025-04-07

## 2025-04-07 NOTE — PROGRESS NOTES
Telehealth Follow Up Visit       Patient Name: Celeste Johnson  : 2005   MRN: [id number]     Referring Provider: Gisela Elizabeth MD    Chief Complaint:       ICD-10-CM ICD-9-CM   1. Generalized anxiety disorder with panic attacks  F41.1 300.02    F41.0 300.01        Celeste Johnson is a 19 y.o. female who presents for virtual follow-up visit related to anxiety.  History of Present Illness   She was last seen on 2025, at which time her Lexapro dosage was increased to 20 mg. She reports a noticeable improvement in her symptoms with the higher dose, experiencing less frequent episodes of anxiety. However, she continues to experience anxiety in certain situations, rating her current level of anxiety as 4 on a scale of 0 to 10 with 10 being the worst. She reports no recent issues with depression. She continues to use propranolol on an as-needed basis and denies medication side effects.    She has been experiencing sleep disturbances, which she attributes to her work schedule rather than the medication. Her sleep pattern is irregular, with some days characterized by 4 to 5 hours of sleep, followed by a day of excessive sleep lasting up to 12 hours. She also reports difficulty initiating sleep at night. She has not tried magnesium but has used melatonin, which she did not find beneficial. Her appetite remains normal, and she has not observed any significant weight changes.      Patient denies any health changes since her last visit. She feels her medications are effective and  does not believe any changes to her current treatment plan are necessary.  Pt shares she has not decided if she is going to stay in [place] for the summer.     MEDICATIONS  Lexapro, propranolol  Diagnosis:anxiety  Medications: none  Therapy: none    Subjective      Review of Systems:   Review of Systems   Constitutional:  Positive for activity change, appetite change and fatigue.   Psychiatric/Behavioral:  Positive  for dysphoric mood and sleep disturbance. The patient is nervous/anxious.        PHQ-9 Depression Screening  Little interest or pleasure in doing things? (Patient-Rptd) Not at all   Feeling down, depressed, or hopeless? (Patient-Rptd) Not at all   PHQ-2 Total Score (Patient-Rptd) 0   Trouble falling or staying asleep, or sleeping too much? (Patient-Rptd) Several days   Feeling tired or having little energy? (Patient-Rptd) Several days   Poor appetite or overeating? (Patient-Rptd) Several days   Feeling bad about yourself - or that you are a failure or have let yourself or your family down? (Patient-Rptd) Not at all   Trouble concentrating on things, such as reading the newspaper or watching television? (Patient-Rptd) Not at all   Moving or speaking so slowly that other people could have noticed? Or the opposite - being so fidgety or restless that you have been moving around a lot more than usual? (Patient-Rptd) Not at all   Thoughts that you would be better off dead, or of hurting yourself in some way? (Patient-Rptd) Not at all   PHQ-9 Total Score (Patient-Rptd) 3   If you checked off any problems, how difficult have these problems made it for you to do your work, take care of things at home, or get along with other people? (Patient-Rptd) Not difficult at all       LEXA-7           Patient History:   The following portions of the patient's history were reviewed and updated as appropriate: allergies, current medications, past family history, past medical history, past social history, past surgical history and problem list.     Social History     Socioeconomic History    Marital status: Single   Tobacco Use    Smoking status: Never     Passive exposure: Never    Smokeless tobacco: Never   Vaping Use    Vaping status: Never Used   Substance and Sexual Activity    Alcohol use: Yes     Alcohol/week: 3.0 standard drinks of alcohol     Types: 3 Cans of beer per week    Drug use: Yes     Types: Marijuana    Sexual activity:  Defer     Past Psychiatric History:   History of outpatient psychiatrist: denies  Diagnoses: anxiety  History of outpatient therapy: denies  Previous Inpatient hospitalizations: denies  Previous medication trials:   Buspar 15 ineffective             History of suicide/self harm attempts: denies       Substance Abuse History/Last use:              Alcohol: 3 drinks per week              Tobacco/Vape: none              Illicit Drugs: Marijuana 1x week              Seizures:none              Caffeine: 1 energy drink per week     Medications:     Current Outpatient Medications:     escitalopram (Lexapro) 20 MG tablet, Take 1 tablet by mouth Daily., Disp: 90 tablet, Rfl: 0    ondansetron (Zofran) 4 MG tablet, Take 1 tablet by mouth Daily., Disp: 30 tablet, Rfl: 0    propranolol (INDERAL) 10 MG tablet, Take 1-2 tablets by mouth Daily As Needed (anxiety)., Disp: 120 tablet, Rfl: 0    Objective     Physical Exam  Vital Signs  Weight is 123 pounds.  Vital Signs:   Vitals:    04/07/25 1221   Weight: 55.8 kg (123 lb)     Body mass index is 17.65 kg/m².       Mental Status Exam:   MENTAL STATUS EXAM   General Appearance:  Cleanly groomed and dressed and well developed  Eye Contact:  Good eye contact  Attitude:  Cooperative  Motor Activity:  Other  Other Comment:  Standing during visit  Muscle Strength:  Normal  Speech:  Normal rate, tone, volume  Language:  Spontaneous  Mood and affect:  Normal, pleasant  Hopelessness:  Denies  Loneliness: Denies  Thought Process:  Logical, goal-directed and linear  Associations/ Thought Content:  No delusions  Hallucinations:  None  Suicidal Ideations:  Not present  Homicidal Ideation:  Not present  Sensorium:  Alert and clear  Orientation:  Person, place, time and situation  Immediate Recall, Recent, and Remote Memory:  Intact  Attention Span/ Concentration:  Good  Fund of Knowledge:  Appropriate for age and educational level  Intellectual Functioning:  Average range  Insight:   "Fair  Judgement:  Good  Reliability:  Good  Impulse Control:  Good       @RESULASTCBCDIFFPANEL,TSH,LABLIPI,DIIOMDOE81,QOQRINBM50,MG,FOLATE,PROLACTIN,CRPRESULT,CMP,[id number])@    Lab Results   Component Value Date    GLUCOSE 117 (H) 09/28/2024    BUN 6 09/28/2024    CREATININE 0.83 09/28/2024    EGFR 103.6 09/28/2024    BCR 7.2 09/28/2024    K 3.7 09/28/2024    CO2 24.0 09/28/2024    CALCIUM 9.2 09/28/2024    ALBUMIN 5.0 09/28/2024    BILITOT 0.5 09/28/2024    AST 19 09/28/2024    ALT 13 09/28/2024       Lab Results   Component Value Date    WBC 5.28 09/28/2024    HGB 14.7 09/28/2024    HCT 43.6 09/28/2024    MCV 95.0 09/28/2024     09/28/2024       No results found for: \"CHOL\", \"CHLPL\", \"TRIG\", \"HDL\", \"LDL\"    Results  Aaron Borges MD     9/28/2024  5:46 AM  ECG 12 Lead      Date/Time: 9/28/2024 5:40 AM    Performed by: Aaron Borges MD  Authorized by: Aaron Borges MD  Interpreted by ED physician  Previous ECG: no previous ECG available  Rhythm: sinus tachycardia  Rate: tachycardic  BPM: 104  QRS axis: right  Conduction: conduction normal  ST Segments: ST segments normal  Other: no other findings  Clinical impression: normal ECG  Comments: Interpretation:  EKG was directly visualized by myself, interpretations as documented in  hospital course.   Interpreted by Aaron Borges MD on 9/28/2024  5:46 AM EDT                  Assessment / Plan      Visit Diagnosis/Orders Placed This Visit:  Diagnoses and all orders for this visit:    1. Generalized anxiety disorder with panic attacks (Primary)  -     escitalopram (Lexapro) 20 MG tablet; Take 1 tablet by mouth Daily.  Dispense: 90 tablet; Refill: 0  -     propranolol (INDERAL) 10 MG tablet; Take 1-2 tablets by mouth Daily As Needed (anxiety).  Dispense: 120 tablet; Refill: 0      Assessment & Plan     1. Anxiety.  She reports a noticeable improvement in her anxiety symptoms since increasing Lexapro to 20 mg, with a current anxiety " level of 4 out of 10. No side effects from the medication were noted. She has been on this dose for approximately 4 to 6 weeks and may continue to see further improvement. She is advised to try magnesium glycinate for her sleep issues, as it can be relaxing. A 90-day supply of Lexapro 20 mg will be sent to her pharmacy to ensure continuity of care. If she decides to relocate, she should inform the office accordingly.    2. Panic attacks.  She continues to use propranolol as needed for panic attacks and does not feel any changes are necessary at this time.    Follow-up  The patient will follow up in 3 months.       Plan:   Continue Lexapro 20 mg daily  Continue propranolol 10-20 mg daily as needed for anxiety  Zofran 4 mg daily as needed for medication related nausea  Labs and Thang reviewed    Continue supportive psychotherapy efforts and medications as indicated. Treatment and medication options discussed during today's visit. Patient ackowledged and verbally consented to continue with current treatment plan and was educated on the importance of compliance with treatment and follow-up appointments. Patient seems reasonably able to adhere to treatment plan.      Medication Considerations:  Discussed medication options and treatment plan of prescribed medication(s) as well as the risks, benefits, and potential side effects. Patient is agreeable to call the office with any worsening of symptoms or onset of side effects. Patient is agreeable to call 911 or go to the nearest ER should he/she begin having SI/HI.    Quality Measures:   Never smoker    I advised Celeste Johnson of the risks of tobacco use.     Follow Up:   Return in about 3 months (around 7/7/2025).      RACHEL Young, [facility]-BC    Patient or patient representative verbalized consent for the use of Ambient Listening during the visit with  RACHEL Mccarthy for chart documentation. 4/7/2025  12:18 EST

## 2025-08-12 ENCOUNTER — OFFICE VISIT (OUTPATIENT)
Age: 20
End: 2025-08-12
Payer: COMMERCIAL

## 2025-08-12 VITALS
HEART RATE: 81 BPM | BODY MASS INDEX: 18.56 KG/M2 | HEIGHT: 70 IN | OXYGEN SATURATION: 99 % | DIASTOLIC BLOOD PRESSURE: 66 MMHG | WEIGHT: 129.6 LBS | SYSTOLIC BLOOD PRESSURE: 102 MMHG

## 2025-08-12 DIAGNOSIS — F99 INSOMNIA DUE TO OTHER MENTAL DISORDER: ICD-10-CM

## 2025-08-12 DIAGNOSIS — F51.05 INSOMNIA DUE TO OTHER MENTAL DISORDER: ICD-10-CM

## 2025-08-12 DIAGNOSIS — F41.1 GENERALIZED ANXIETY DISORDER WITH PANIC ATTACKS: Primary | ICD-10-CM

## 2025-08-12 DIAGNOSIS — F41.0 GENERALIZED ANXIETY DISORDER WITH PANIC ATTACKS: Primary | ICD-10-CM

## 2025-08-12 RX ORDER — ESCITALOPRAM OXALATE 20 MG/1
20 TABLET ORAL DAILY
Qty: 90 TABLET | Refills: 0 | Status: SHIPPED | OUTPATIENT
Start: 2025-08-12

## 2025-08-12 RX ORDER — BUSPIRONE HYDROCHLORIDE 10 MG/1
TABLET ORAL
Qty: 106 TABLET | Refills: 0 | Status: SHIPPED | OUTPATIENT
Start: 2025-08-12 | End: 2025-10-11

## 2025-08-12 RX ORDER — PROPRANOLOL HYDROCHLORIDE 10 MG/1
10-20 TABLET ORAL DAILY PRN
Qty: 120 TABLET | Refills: 0 | Status: SHIPPED | OUTPATIENT
Start: 2025-08-12